# Patient Record
Sex: MALE | Race: WHITE | NOT HISPANIC OR LATINO | Employment: OTHER | URBAN - METROPOLITAN AREA
[De-identification: names, ages, dates, MRNs, and addresses within clinical notes are randomized per-mention and may not be internally consistent; named-entity substitution may affect disease eponyms.]

---

## 2018-11-13 ENCOUNTER — OFFICE VISIT (OUTPATIENT)
Dept: INTERNAL MEDICINE CLINIC | Facility: CLINIC | Age: 58
End: 2018-11-13
Payer: COMMERCIAL

## 2018-11-13 VITALS
BODY MASS INDEX: 37.51 KG/M2 | OXYGEN SATURATION: 98 % | WEIGHT: 262 LBS | TEMPERATURE: 98.4 F | HEART RATE: 88 BPM | RESPIRATION RATE: 18 BRPM | DIASTOLIC BLOOD PRESSURE: 80 MMHG | SYSTOLIC BLOOD PRESSURE: 134 MMHG | HEIGHT: 70 IN

## 2018-11-13 DIAGNOSIS — R03.0 PRE-HYPERTENSION: ICD-10-CM

## 2018-11-13 DIAGNOSIS — Z13.220 ENCOUNTER FOR LIPID SCREENING FOR CARDIOVASCULAR DISEASE: ICD-10-CM

## 2018-11-13 DIAGNOSIS — Z76.89 ENCOUNTER TO ESTABLISH CARE WITH NEW DOCTOR: Primary | ICD-10-CM

## 2018-11-13 DIAGNOSIS — Z12.11 SCREEN FOR COLON CANCER: ICD-10-CM

## 2018-11-13 DIAGNOSIS — Z13.6 ENCOUNTER FOR LIPID SCREENING FOR CARDIOVASCULAR DISEASE: ICD-10-CM

## 2018-11-13 DIAGNOSIS — N52.9 ERECTILE DYSFUNCTION, UNSPECIFIED ERECTILE DYSFUNCTION TYPE: ICD-10-CM

## 2018-11-13 PROCEDURE — 99204 OFFICE O/P NEW MOD 45 MIN: CPT | Performed by: INTERNAL MEDICINE

## 2018-11-13 PROCEDURE — 3008F BODY MASS INDEX DOCD: CPT | Performed by: INTERNAL MEDICINE

## 2018-11-13 PROCEDURE — 1036F TOBACCO NON-USER: CPT | Performed by: INTERNAL MEDICINE

## 2018-11-13 NOTE — PATIENT INSTRUCTIONS
1  Fasting blood work  2  Colonoscopy  3  See information below  4   If blood work looks fine -> recommend viagra 25mg to start

## 2018-11-13 NOTE — PROGRESS NOTES
Assessment/Plan:     Diagnoses and all orders for this visit:    Encounter to establish care with new doctor    Screen for colon cancer  Comments:  never had colonoscopy/colon cancer screening in past -> ref provided  Orders:  -     Ambulatory referral to Colorectal Surgery; Future    Erectile dysfunction, unspecified erectile dysfunction type  Comments:  BW ordered and if WNL -> t/c sildenafil rx(already d/w pt side effects of medication and handout provided on the same)  Orders:  -     Comprehensive metabolic panel; Future  -     TSH, 3rd generation with Free T4 reflex  -     CBC and differential  -     UA w Reflex to Microscopic w Reflex to Culture -Lab Collect  -     Prolactin; Future    Pre-hypertension  Comments:  discussed weight loss/lifestyle changes    Encounter for lipid screening for cardiovascular disease  -     Lipid Panel with Direct LDL reflex; Future          Subjective:      Patient ID: Adela Hale is a 62 y o  male  HPI    New to practice, here to establish care  Takes no medication on regular basis  No past med hx problems except excess weight and recent months of having Erectile dysfunction symptoms  Hasn't seen a dr in 8 or more years  declines flu vaccine  Never had colonoscopy in past, requests referral to have this completed  Has no problem with libido or ejaculation and no  rashes or lesions  Under more stress lately, his father passed away s/p strokes/dementia in 80s  Denies any other complaints      Family History   Problem Relation Age of Onset    Stroke Father 66    Diabetes Father 76    Alzheimer's disease Father     Heart attack Father         dx'd in 76s     Social History     Social History    Marital status: /Civil Union     Spouse name: N/A    Number of children: N/A    Years of education: N/A     Occupational History    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[address] [address] [address] including [address]     Social History Main Topics    Smoking status: Never Smoker    Smokeless tobacco: Never Used    Alcohol use Yes      Comment: occasional    Drug use: No    Sexual activity: Not on file     Other Topics Concern    Not on file     Social History Narrative    Drinks coffee     Past Medical History:   Diagnosis Date    Blood type A+      Vitals:    11/13/18 0823 11/13/18 0900   BP: 140/84 134/80   Pulse: 88    Resp: 18    Temp: 98 4 °F (36 9 °C)    TempSrc: Oral    SpO2: 98%    Weight: 119 kg (262 lb)    Height: 5' 10" (1 778 m)      Body mass index is 37 59 kg/m²  No current outpatient prescriptions on file  No Known Allergies  Past Surgical History:   Procedure Laterality Date    HERNIA REPAIR Left     twice repaired in past    TONSILLECTOMY           Review of Systems   Constitutional: Negative for fever  HENT: Negative for congestion  Eyes: Negative for visual disturbance  Respiratory: Negative for shortness of breath  Cardiovascular: Negative for chest pain  Gastrointestinal: Negative for abdominal pain  Genitourinary: Negative for discharge, dysuria and penile swelling  Musculoskeletal: Negative for arthralgias  Skin: Negative for rash  Neurological: Negative for headaches  Psychiatric/Behavioral: Negative for dysphoric mood  Objective:      /80   Pulse 88   Temp 98 4 °F (36 9 °C) (Oral)   Resp 18   Ht 5' 10" (1 778 m)   Wt 119 kg (262 lb)   SpO2 98%   BMI 37 59 kg/m²          Physical Exam   Constitutional: He appears well-developed and well-nourished  +obese   HENT:   Head: Normocephalic and atraumatic  Mouth/Throat: Oropharynx is clear and moist    Eyes: Pupils are equal, round, and reactive to light  Conjunctivae are normal    Cardiovascular: Normal rate, regular rhythm and normal heart sounds  No murmur heard  Pulmonary/Chest: Effort normal and breath sounds normal  He has no wheezes  He has no rales  Abdominal: Soft  Bowel sounds are normal  There is no tenderness     Musculoskeletal: He exhibits no edema  Neurological: He is alert  Psychiatric: He has a normal mood and affect  His behavior is normal    Vitals reviewed

## 2018-11-20 LAB
ALBUMIN SERPL-MCNC: 4.2 G/DL (ref 3.6–5.1)
ALBUMIN/GLOB SERPL: 1.4 (CALC) (ref 1–2.5)
ALP SERPL-CCNC: 32 U/L (ref 40–115)
ALT SERPL-CCNC: 23 U/L (ref 9–46)
APPEARANCE UR: CLEAR
AST SERPL-CCNC: 16 U/L (ref 10–35)
BACTERIA UR CULT: NORMAL
BACTERIA UR QL AUTO: NORMAL /HPF
BASOPHILS # BLD AUTO: 71 CELLS/UL (ref 0–200)
BASOPHILS NFR BLD AUTO: 0.8 %
BILIRUB SERPL-MCNC: 0.6 MG/DL (ref 0.2–1.2)
BILIRUB UR QL STRIP: NEGATIVE
BUN SERPL-MCNC: 13 MG/DL (ref 7–25)
BUN/CREAT SERPL: ABNORMAL (CALC) (ref 6–22)
CALCIUM SERPL-MCNC: 9.3 MG/DL (ref 8.6–10.3)
CHLORIDE SERPL-SCNC: 100 MMOL/L (ref 98–110)
CHOLEST SERPL-MCNC: 172 MG/DL
CHOLEST/HDLC SERPL: 3.7 (CALC)
CO2 SERPL-SCNC: 30 MMOL/L (ref 20–32)
COLOR UR: YELLOW
CREAT SERPL-MCNC: 1.04 MG/DL (ref 0.7–1.33)
EOSINOPHIL # BLD AUTO: 151 CELLS/UL (ref 15–500)
EOSINOPHIL NFR BLD AUTO: 1.7 %
ERYTHROCYTE [DISTWIDTH] IN BLOOD BY AUTOMATED COUNT: 12.7 % (ref 11–15)
GLOBULIN SER CALC-MCNC: 3.1 G/DL (CALC) (ref 1.9–3.7)
GLUCOSE SERPL-MCNC: 98 MG/DL (ref 65–99)
GLUCOSE UR QL STRIP: NEGATIVE
HCT VFR BLD AUTO: 44.7 % (ref 38.5–50)
HDLC SERPL-MCNC: 46 MG/DL
HGB BLD-MCNC: 14.9 G/DL (ref 13.2–17.1)
HGB UR QL STRIP: NEGATIVE
HYALINE CASTS #/AREA URNS LPF: NORMAL /LPF
KETONES UR QL STRIP: NEGATIVE
LDLC SERPL CALC-MCNC: 107 MG/DL (CALC)
LEUKOCYTE ESTERASE UR QL STRIP: NEGATIVE
LYMPHOCYTES # BLD AUTO: 2412 CELLS/UL (ref 850–3900)
LYMPHOCYTES NFR BLD AUTO: 27.1 %
MCH RBC QN AUTO: 30 PG (ref 27–33)
MCHC RBC AUTO-ENTMCNC: 33.3 G/DL (ref 32–36)
MCV RBC AUTO: 90.1 FL (ref 80–100)
MONOCYTES # BLD AUTO: 908 CELLS/UL (ref 200–950)
MONOCYTES NFR BLD AUTO: 10.2 %
NEUTROPHILS # BLD AUTO: 5358 CELLS/UL (ref 1500–7800)
NEUTROPHILS NFR BLD AUTO: 60.2 %
NITRITE UR QL STRIP: NEGATIVE
NONHDLC SERPL-MCNC: 126 MG/DL (CALC)
PH UR STRIP: 6 [PH] (ref 5–8)
PLATELET # BLD AUTO: 272 THOUSAND/UL (ref 140–400)
PMV BLD REES-ECKER: 10.1 FL (ref 7.5–12.5)
POTASSIUM SERPL-SCNC: 4.5 MMOL/L (ref 3.5–5.3)
PROLACTIN SERPL-MCNC: 8.3 NG/ML (ref 2–18)
PROT SERPL-MCNC: 7.3 G/DL (ref 6.1–8.1)
PROT UR QL STRIP: NEGATIVE
RBC # BLD AUTO: 4.96 MILLION/UL (ref 4.2–5.8)
RBC #/AREA URNS HPF: NORMAL /HPF
SL AMB EGFR AFRICAN AMERICAN: 91 ML/MIN/1.73M2
SL AMB EGFR NON AFRICAN AMERICAN: 79 ML/MIN/1.73M2
SODIUM SERPL-SCNC: 137 MMOL/L (ref 135–146)
SP GR UR STRIP: 1.02 (ref 1–1.03)
SQUAMOUS #/AREA URNS HPF: NORMAL /HPF
TRIGL SERPL-MCNC: 94 MG/DL
TSH SERPL-ACNC: 1.88 MIU/L (ref 0.4–4.5)
WBC # BLD AUTO: 8.9 THOUSAND/UL (ref 3.8–10.8)
WBC #/AREA URNS HPF: NORMAL /HPF

## 2018-11-23 ENCOUNTER — TELEPHONE (OUTPATIENT)
Dept: INTERNAL MEDICINE CLINIC | Facility: CLINIC | Age: 58
End: 2018-11-23

## 2018-11-23 DIAGNOSIS — N52.8 OTHER MALE ERECTILE DYSFUNCTION: Primary | ICD-10-CM

## 2018-11-23 NOTE — TELEPHONE ENCOUNTER
----- Message from Nancy Lozada, DO sent at 11/21/2018  4:05 PM EST -----  BW is back and looks fine including cholesterol which is 172  Does Abdirahmanney Guzmanniall want to try sildenafil(viagra) medication?

## 2018-11-27 PROBLEM — N52.8 OTHER MALE ERECTILE DYSFUNCTION: Status: ACTIVE | Noted: 2018-11-27

## 2018-11-27 RX ORDER — SILDENAFIL 25 MG/1
25 TABLET, FILM COATED ORAL DAILY PRN
Qty: 10 TABLET | Refills: 1 | Status: SHIPPED | OUTPATIENT
Start: 2018-11-27 | End: 2019-02-14 | Stop reason: SDUPTHER

## 2018-11-27 NOTE — TELEPHONE ENCOUNTER
Patient called and was notified and yes he does want the Sildenafil ( Viagra) sent to the Pharmacy on file please

## 2019-02-14 ENCOUNTER — OFFICE VISIT (OUTPATIENT)
Dept: INTERNAL MEDICINE CLINIC | Facility: CLINIC | Age: 59
End: 2019-02-14
Payer: COMMERCIAL

## 2019-02-14 VITALS
HEIGHT: 70 IN | SYSTOLIC BLOOD PRESSURE: 130 MMHG | HEART RATE: 82 BPM | RESPIRATION RATE: 18 BRPM | OXYGEN SATURATION: 99 % | WEIGHT: 262.6 LBS | BODY MASS INDEX: 37.59 KG/M2 | DIASTOLIC BLOOD PRESSURE: 80 MMHG | TEMPERATURE: 97.1 F

## 2019-02-14 DIAGNOSIS — E66.9 CLASS 2 OBESITY WITHOUT SERIOUS COMORBIDITY WITH BODY MASS INDEX (BMI) OF 37.0 TO 37.9 IN ADULT, UNSPECIFIED OBESITY TYPE: ICD-10-CM

## 2019-02-14 DIAGNOSIS — N52.8 OTHER MALE ERECTILE DYSFUNCTION: Primary | ICD-10-CM

## 2019-02-14 DIAGNOSIS — Z11.59 NEED FOR HEPATITIS C SCREENING TEST: ICD-10-CM

## 2019-02-14 PROBLEM — E66.812 CLASS 2 OBESITY WITHOUT SERIOUS COMORBIDITY WITH BODY MASS INDEX (BMI) OF 37.0 TO 37.9 IN ADULT: Status: ACTIVE | Noted: 2019-02-14

## 2019-02-14 PROCEDURE — 1036F TOBACCO NON-USER: CPT | Performed by: INTERNAL MEDICINE

## 2019-02-14 PROCEDURE — 99213 OFFICE O/P EST LOW 20 MIN: CPT | Performed by: INTERNAL MEDICINE

## 2019-02-14 PROCEDURE — 3008F BODY MASS INDEX DOCD: CPT | Performed by: INTERNAL MEDICINE

## 2019-02-14 RX ORDER — SILDENAFIL 50 MG/1
50 TABLET, FILM COATED ORAL DAILY PRN
Qty: 10 TABLET | Refills: 1 | Status: SHIPPED | OUTPATIENT
Start: 2019-02-14 | End: 2021-10-05 | Stop reason: SDUPTHER

## 2019-02-14 RX ORDER — SILDENAFIL 25 MG/1
25 TABLET, FILM COATED ORAL DAILY PRN
Qty: 10 TABLET | Refills: 1 | Status: SHIPPED | OUTPATIENT
Start: 2019-02-14 | End: 2019-02-14

## 2019-02-14 NOTE — PROGRESS NOTES
Assessment/Plan:     Diagnoses and all orders for this visit:    Other male erectile dysfunction  Comments:  c/w viagra, dose increase today as trial for better efficacy  Orders:  -     Discontinue: sildenafil (VIAGRA) 25 MG tablet; Take 1 tablet (25 mg total) by mouth daily as needed for erectile dysfunction  -     sildenafil (VIAGRA) 50 MG tablet; Take 1 tablet (50 mg total) by mouth daily as needed for erectile dysfunction    Need for hepatitis C screening test  -     Hepatitis C antibody; Future    Class 2 obesity without serious comorbidity with body mass index (BMI) of 37 0 to 37 9 in adult, unspecified obesity type  Comments:  weight loss counseling provided          Subjective:      Patient ID: Valentino Alice is a 62 y o  male  HPI    Here for follow up, taking viagra prn with some improvement in ED and no side effects  Denies CP/SOB/BALTAZAR at rest or with exertion  Last summer he was walking 35-40+ miles/week and wants to get back on an exercise routine to lose weight  Does not eat snacks or sugary drinks  Does have more carbs in diet and looking to cut back  Never had Hep C screening in past   Reports UTD on tetanus vaccine in last 3-4 years(works in [employer])  No other complaints  Past Medical History:   Diagnosis Date    Blood type A+     Other male erectile dysfunction 11/27/2018     Vitals:    02/14/19 0756   BP: 130/80   Pulse: 82   Resp: 18   Temp: (!) 97 1 °F (36 2 °C)   SpO2: 99%   Weight: 119 kg (262 lb 9 6 oz)   Height: 5' 10" (1 778 m)     Body mass index is 37 68 kg/m²  Current Outpatient Medications:     sildenafil (VIAGRA) 50 MG tablet, Take 1 tablet (50 mg total) by mouth daily as needed for erectile dysfunction, Disp: 10 tablet, Rfl: 1  No Known Allergies      Review of Systems   Constitutional: Negative for fever  HENT: Negative for congestion  Eyes: Negative for visual disturbance  Respiratory: Negative for shortness of breath      Cardiovascular: Negative for chest pain  Gastrointestinal: Negative for abdominal pain  Genitourinary: Negative for dysuria  Musculoskeletal: Negative for arthralgias  Skin: Negative for rash  Neurological: Negative for headaches  Psychiatric/Behavioral: Negative for dysphoric mood  Objective:      /80   Pulse 82   Temp (!) 97 1 °F (36 2 °C)   Resp 18   Ht 5' 10" (1 778 m)   Wt 119 kg (262 lb 9 6 oz)   SpO2 99%   BMI 37 68 kg/m²          Physical Exam   Constitutional: He appears well-developed and well-nourished  HENT:   Head: Normocephalic and atraumatic  Cardiovascular: Normal rate, regular rhythm and normal heart sounds  No murmur heard  Pulmonary/Chest: Effort normal and breath sounds normal  He has no wheezes  He has no rales  Abdominal: Soft  Bowel sounds are normal  There is no tenderness  Musculoskeletal: He exhibits no edema  Neurological: He is alert  Psychiatric: He has a normal mood and affect  His behavior is normal    Vitals reviewed

## 2019-02-14 NOTE — PATIENT INSTRUCTIONS
1  Return in 6 months    Weight Management   AMBULATORY CARE:   Why it is important to manage your weight:  Being overweight increases your risk of health conditions such as heart disease, high blood pressure, type 2 diabetes, and certain types of cancer  It can also increase your risk for osteoarthritis, sleep apnea, and other respiratory problems  Aim for a slow, steady weight loss  Even a small amount of weight loss can lower your risk of health problems  How to lose weight safely:  A safe and healthy way to lose weight is to eat fewer calories and get regular exercise  You can lose up about 1 pound a week by decreasing the number of calories you eat by 500 calories each day  You can decrease calories by eating smaller portion sizes or by cutting out high-calorie foods  Read labels to find out how many calories are in the foods you eat  You can also burn calories with exercise such as walking, swimming, or biking  You will be more likely to keep weight off if you make these changes part of your lifestyle  Healthy meal plan for weight management:  A healthy meal plan includes a variety of foods, contains fewer calories, and helps you stay healthy  A healthy meal plan includes the following:  · Eat whole-grain foods more often  A healthy meal plan should contain fiber  Fiber is the part of grains, fruits, and vegetables that is not broken down by your body  Whole-grain foods are healthy and provide extra fiber in your diet  Some examples of whole-grain foods are whole-wheat breads and pastas, oatmeal, brown rice, and bulgur  · Eat a variety of vegetables every day  Include dark, leafy greens such as spinach, kale, rachele greens, and mustard greens  Eat yellow and orange vegetables such as carrots, sweet potatoes, and winter squash  · Eat a variety of fruits every day  Choose fresh or canned fruit (canned in its own juice or light syrup) instead of juice   Fruit juice has very little or no fiber     · Eat low-fat dairy foods  Drink fat-free (skim) milk or 1% milk  Eat fat-free yogurt and low-fat cottage cheese  Try low-fat cheeses such as mozzarella and other reduced-fat cheeses  · Choose meat and other protein foods that are low in fat  Choose beans or other legumes such as split peas or lentils  Choose fish, skinless poultry (chicken or turkey), or lean cuts of red meat (beef or pork)  Before you cook meat or poultry, cut off any visible fat  · Use less fat and oil  Try baking foods instead of frying them  Add less fat, such as margarine, sour cream, regular salad dressing and mayonnaise to foods  Eat fewer high-fat foods  Some examples of high-fat foods include french fries, doughnuts, ice cream, and cakes  · Eat fewer sweets  Limit foods and drinks that are high in sugar  This includes candy, cookies, regular soda, and sweetened drinks  Ways to decrease calories:   · Eat smaller portions  ¨ Use a small plate with smaller servings  ¨ Do not eat second helpings  ¨ When you eat at a restaurant, ask for a box and place half of your meal in the box before you eat  ¨ Share an entrée with someone else  · Replace high-calorie snacks with healthy, low-calorie snacks  ¨ Choose fresh fruit, vegetables, fat-free rice cakes, or air-popped popcorn instead of potato chips, nuts, or chocolate  ¨ Choose water or calorie-free drinks instead of soda or sweetened drinks  · Eat regular meals  Skipping meals can lead to overeating later in the day  Eat a healthy snack in place of a meal if you do not have time to eat a regular meal      · Do not shop for groceries when you are hungry  You may be more likely to make unhealthy food choices  Take a grocery list of healthy foods and shop after you have eaten  Exercise:  Exercise at least 30 minutes per day on most days of the week  Some examples of exercise include walking, biking, dancing, and swimming   You can also fit in more physical activity by taking the stairs instead of the elevator or parking farther away from stores  Ask your healthcare provider about the best exercise plan for you  Other things to consider as you try to lose weight:   · Be aware of situations that may give you the urge to overeat, such as eating while watching television  Find ways to avoid these situations  For example, read a book, go for a walk, or do crafts  · Meet with a weight loss support group or friends who are also trying to lose weight  This may help you stay motivated to continue working on your weight loss goals  © 2017 [address] [employer]  Information is for End User's use only and may not be sold, redistributed or otherwise used for commercial purposes  All illustrations and images included in CareNotes® are the copyrighted property of [employer]  or [employer]  The above information is an  only  It is not intended as medical advice for individual conditions or treatments  Talk to your doctor, nurse or pharmacist before following any medical regimen to see if it is safe and effective for you

## 2019-03-26 ENCOUNTER — TELEPHONE (OUTPATIENT)
Dept: INTERNAL MEDICINE CLINIC | Facility: CLINIC | Age: 59
End: 2019-03-26

## 2019-03-26 NOTE — TELEPHONE ENCOUNTER
Left message for patient to call back regarding CRC due  Colonoscopy Due:  Patient is due for colonoscopy  Has patient had this done with in the past, if yes what year and where- which dr? If no it is recommended to have this done, can mail referral for patient to have this done  If patient refuses colonoscopy- Patient also has the option to have a Fit Kit done which would be a stool sample- this would need to be done yearly  If this would be positive a colonoscopy would need to be completed

## 2019-10-15 ENCOUNTER — OFFICE VISIT (OUTPATIENT)
Dept: INTERNAL MEDICINE CLINIC | Facility: CLINIC | Age: 59
End: 2019-10-15
Payer: COMMERCIAL

## 2019-10-15 VITALS
HEIGHT: 70 IN | HEART RATE: 81 BPM | TEMPERATURE: 97.3 F | DIASTOLIC BLOOD PRESSURE: 74 MMHG | BODY MASS INDEX: 30.35 KG/M2 | OXYGEN SATURATION: 97 % | RESPIRATION RATE: 18 BRPM | WEIGHT: 212 LBS | SYSTOLIC BLOOD PRESSURE: 132 MMHG

## 2019-10-15 DIAGNOSIS — Z12.5 SCREENING FOR PROSTATE CANCER: ICD-10-CM

## 2019-10-15 DIAGNOSIS — E66.09 CLASS 1 OBESITY DUE TO EXCESS CALORIES WITHOUT SERIOUS COMORBIDITY WITH BODY MASS INDEX (BMI) OF 30.0 TO 30.9 IN ADULT: Primary | ICD-10-CM

## 2019-10-15 DIAGNOSIS — D22.9 CHANGE IN SKIN MOLE: ICD-10-CM

## 2019-10-15 DIAGNOSIS — L72.11 PILAR CYST: ICD-10-CM

## 2019-10-15 PROBLEM — E66.811 CLASS 1 OBESITY DUE TO EXCESS CALORIES WITH BODY MASS INDEX (BMI) OF 30.0 TO 30.9 IN ADULT: Status: ACTIVE | Noted: 2019-02-14

## 2019-10-15 PROCEDURE — 3008F BODY MASS INDEX DOCD: CPT | Performed by: NURSE PRACTITIONER

## 2019-10-15 PROCEDURE — 99213 OFFICE O/P EST LOW 20 MIN: CPT | Performed by: NURSE PRACTITIONER

## 2019-10-15 NOTE — PROGRESS NOTES
Assessment/Plan:    Class 1 obesity due to excess calories with body mass index (BMI) of 30 0 to 30 9 in adult  BMI Counseling: Body mass index is 30 42 kg/m²  The BMI is above normal  Nutrition recommendations include reducing portion sizes, decreasing overall calorie intake, 3-5 servings of fruits/vegetables daily, reducing fast food intake, consuming healthier snacks, decreasing soda and/or juice intake, moderation in carbohydrate intake and increasing intake of lean protein  Exercise recommendations include exercising 3-5 times per week  continue current exercise and eating plan! Lost 56 lbs over the last 8 months! Diagnoses and all orders for this visit:    Class 1 obesity due to excess calories without serious comorbidity with body mass index (BMI) of 30 0 to 30 9 in adult  -     Comprehensive metabolic panel; Future  -     Lipid Panel with Direct LDL reflex; Future    Change in skin mole  -     Ambulatory referral to Dermatology; Future    Pilar cyst  -     Ambulatory referral to Dermatology; Future    Screening for prostate cancer  -     PSA Total, Diagnostic; Future          Subjective:      Patient ID: Paulette Solorio is a 61 y o  male      Here today for follow up  Zana Cayr reports doing great  Lost 56 lbs in the last 8 months   Walks 70 miles a week, does 80 sit ups daily    He has been eating healthy as well   His ED has improved since losing weight, he has not needed viagra     He is concerned about a mole on his left leg  Its been there forever but recently noticed it changing color and getting bitter     Also he had a cyst removed from his scalp years ago and its coming back  He's requesting to have it removed again   It does not bother him             The following portions of the patient's history were reviewed and updated as appropriate: allergies, current medications, past family history, past medical history, past social history, past surgical history and problem list     Review of Systems Constitutional: Negative for activity change, appetite change, fatigue and unexpected weight change  Eyes: Negative for visual disturbance  Respiratory: Negative for cough, chest tightness, shortness of breath and wheezing  Cardiovascular: Negative for chest pain, palpitations and leg swelling  Gastrointestinal: Negative for abdominal pain, blood in stool, constipation and diarrhea  Genitourinary: Negative for difficulty urinating  Musculoskeletal: Negative for arthralgias  Skin: Negative for color change and rash  Changing mole    Neurological: Negative for dizziness, weakness, light-headedness and headaches  Psychiatric/Behavioral: Negative for sleep disturbance  Objective:      /74   Pulse 81   Temp (!) 97 3 °F (36 3 °C) (Oral)   Resp 18   Ht 5' 10" (1 778 m)   Wt 96 2 kg (212 lb)   SpO2 97%   BMI 30 42 kg/m²          Physical Exam   Constitutional: He is oriented to person, place, and time  He appears well-developed and well-nourished  HENT:   Head: Normocephalic and atraumatic  Mouth/Throat: Oropharynx is clear and moist    Eyes: Pupils are equal, round, and reactive to light  Conjunctivae are normal    Neck: No thyromegaly present  Cardiovascular: Normal rate, regular rhythm, normal heart sounds and intact distal pulses  Pulmonary/Chest: Effort normal and breath sounds normal    Abdominal: Soft  Bowel sounds are normal    Musculoskeletal: Normal range of motion  He exhibits no edema  Lymphadenopathy:     He has no cervical adenopathy  Neurological: He is alert and oriented to person, place, and time  Skin: Skin is warm and dry  Brown nevi noted on left lateral calf , some inconsistent coloring noted in the center, uneven borders    Firm non tender lump noted on top of scalp    Psychiatric: He has a normal mood and affect  His behavior is normal    Vitals reviewed

## 2019-10-15 NOTE — ASSESSMENT & PLAN NOTE
BMI Counseling: Body mass index is 30 42 kg/m²  The BMI is above normal  Nutrition recommendations include reducing portion sizes, decreasing overall calorie intake, 3-5 servings of fruits/vegetables daily, reducing fast food intake, consuming healthier snacks, decreasing soda and/or juice intake, moderation in carbohydrate intake and increasing intake of lean protein  Exercise recommendations include exercising 3-5 times per week  continue current exercise and eating plan! Lost 56 lbs over the last 8 months!

## 2020-08-11 ENCOUNTER — OFFICE VISIT (OUTPATIENT)
Dept: DERMATOLOGY | Facility: CLINIC | Age: 60
End: 2020-08-11
Payer: COMMERCIAL

## 2020-08-11 VITALS — BODY MASS INDEX: 31.68 KG/M2 | TEMPERATURE: 98.6 F | WEIGHT: 221.3 LBS | HEIGHT: 70 IN

## 2020-08-11 DIAGNOSIS — L82.1 SEBORRHEIC KERATOSIS: Primary | ICD-10-CM

## 2020-08-11 DIAGNOSIS — L72.11 PILAR CYST: ICD-10-CM

## 2020-08-11 PROCEDURE — 1036F TOBACCO NON-USER: CPT | Performed by: STUDENT IN AN ORGANIZED HEALTH CARE EDUCATION/TRAINING PROGRAM

## 2020-08-11 PROCEDURE — 99203 OFFICE O/P NEW LOW 30 MIN: CPT | Performed by: STUDENT IN AN ORGANIZED HEALTH CARE EDUCATION/TRAINING PROGRAM

## 2020-08-11 NOTE — PROGRESS NOTES
[facility] Note     Patient Name: Betty Duty  Encounter Date: 8/11/2020     Have you been cared for by a [facility] Dermatologist in the last 3 years and, if so, which one? No    · Have you traveled outside of the [address] [place] [address] in the past 3 months or outside of the [facility] area in the last 2 weeks? No     May we call your Preferred Phone number to discuss your specific medical information? Yes     May we leave a detailed message that includes your specific medical information? Yes      Today's Chief Concerns:   Concern #1:  Cyst in scalp   Concern #2:  Lesion on leg     Past Medical History:  Have you personally ever had or currently have any of the following? · Skin cancer (such as Melanoma, Basal Cell Carcinoma, Squamous Cell Carcinoma? (If Yes, please provide more detail)- No  · Eczema: No  · Psoriasis: No  · HIV/AIDS: No  · Hepatitis B or C: No  · Tuberculosis: No  · Systemic Immunosuppression such as Diabetes, Biologic or Immunotherapy, Chemotherapy, Organ Transplantation, Bone Marrow Transplantation (If YES, please provide more detail): No  · Radiation Treatment (If YES, please provide more detail): No  · Any other major medical conditions/concerns? 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 custom firearm art     Family History:  Have any of your "first degree relatives" (parent, brother, sister, or child) had any of the following       · Skin cancer such as Melanoma or Merkel Cell Carcinoma or Pancreatic Cancer? No  · Eczema, Asthma, Hay Fever or Seasonal Allergies: No  · Psoriasis or Psoriatic Arthritis: No  · Do any other medical conditions seem to run in your family? If Yes, what condition and which relatives?   YES, father had diabetes and had a stoke    Current Medications:   (please update all dermatological medications before printing patient's AVS!)      Current Outpatient Medications:     sildenafil (VIAGRA) 50 MG tablet, Take 1 tablet (50 mg total) by mouth daily as needed for erectile dysfunction (Patient not taking: Reported on 10/15/2019), Disp: 10 tablet, Rfl: 1      Review of Systems:  Have you recently had or currently have any of the following? If YES, what are you doing for the problem? · Fever, chills or unintended weight loss: No  · Sudden loss or change in your vision: No  · Nausea, vomiting or blood in your stool: No  · Painful or swollen joints: No  · Wheezing or cough: No  · Changing mole or non-healing wound: No  · Nosebleeds: No  · Excessive sweating: No  · Easy or prolonged bleeding? No  · Over the last 2 weeks, how often have you been bothered by the following problems? · Taking little interest or pleasure in doing things: 1 - Not at All  · Feeling down, depressed, or hopeless: 1 - Not at All  · Rapid heartbeat with epinephrine:  No    · FEMALES ONLY:    · Are you pregnant or planning to become pregnant? No  · Are you currently or planning to be nursing or breast feeding? No    · Any known allergies? · No Known Allergies      Physical Exam:     Was a chaperone (Derm Clinical Assistant) present throughout the entire Physical Exam? Yes     Did the Dermatology Team specifically  the patient on the importance of a Full Skin Exam to be sure that nothing is missed clinically?  Yes}  o Did the patient ultimately request or accept a Full Skin Exam?  NO  o Did the patient specifically refuse to have the areas "under-the-bra" examined by the Dermatologist? No  o Did the patient specifically refuse to have the areas "under-the-underwear" examined by the Dermatologist? No    CONSTITUTIONAL:   Vitals:    08/11/20 0833   Temp: 98 6 °F (37 °C)   TempSrc: Temporal   Weight: 100 kg (221 lb 4 8 oz)   Height: 5' 10" (1 778 m)       PSYCH: Normal mood and affect  EYES: Normal conjunctiva  ENT: Normal lips and oral mucosa  CARDIOVASCULAR: No edema  RESPIRATORY: Normal respirations  HEME/LYMPH/IMMUNO:  No regional lymphadenopathy except as noted below in "ASSESSMENT AND PLAN BY DIAGNOSIS"    SKIN:  FULL ORGAN SYSTEM EXAM  Hair, Scalp, Ears, Face Normal except as noted below in Assessment   Neck Normal except as noted below in Assessment   Right Arm/Hand Normal except as noted below in Assessment   Left Arm/Hand Normal except as noted below in Assessment                   Right Leg, Foot Normal except as noted below in Assessment   Left Leg, Foot Normal except as noted below in Assessment        Assessment and Plan by Diagnosis:    History of Present Condition:     Duration:  How long has this been an issue for you?    o  about one year   Location Affected:  Where on the body is this affecting you?    o  left lower leg   Quality:  Is there any bleeding, pain, itch, burning/irritation, or redness associated with the skin lesion? o  change in shape and size of mole, sometimes tender and red    Severity:  Describe any bleeding, pain, itch, burning/irritation, or redness on a scale of 1 to 10 (with 10 being the worst)  o  3-4   Timing:  Does this condition seem to be there pretty constantly or do you notice it more at specific times throughout the day? o  when rubbed against for prolonged periods of time    Context:  Have you ever noticed that this condition seems to be associated with specific activities you do?    o  denies   Modifying Factors:    o Anything that seems to make the condition worse?    -  denies  o What have you tried to do to make the condition better?    -  denies   Associated Signs and Symptoms:  Does this skin lesion seem to be associated with any of the following:  o  SL AMB DERM SIGNS AND SYMPTOMS: Redness     1   SEBORRHEIC KERATOSIS; NON-INFLAMED    Physical Exam:   Anatomic Location Affected:  Left leg   Morphological Description:  1 cm x 0 5 cm tan macule with some mild surface change  Pertinent Positives:   Pertinent Negatives: Additional History of Present Condition:  Present for about one year  Located on the lower left leg  Patient reports change in shape and size of mole, sometimes red and tender of 3-4 out of 10  No attempted treatment  Assessment and Plan:  Based on a thorough discussion of this condition and the management approach to it (including a comprehensive discussion of the known risks, side effects and potential benefits of treatment), the patient (family) agrees to implement the following specific plan:   When outside we recommend using a wide brim hat, sunglasses, long sleeve and pants, sunscreen with SPF 39+ with reapplication every 2 hours, or SPF specific clothing   [employer]  com website with sun protective clothing     Seborrheic Keratosis  A seborrheic keratosis is a harmless warty spot that appears during adult life as a common sign of skin aging  Seborrheic keratoses can arise on any area of skin, covered or uncovered, with the usual exception of the palms and soles  They do not arise from mucous membranes  Seborrheic keratoses can have highly variable appearance  Seborrheic keratoses are extremely common  It has been estimated that over 90% of adults over the age of 61 years have one or more of them  They occur in males and females of all races, typically beginning to erupt in the 35s or 45s  They are uncommon under the age of 21 years  The precise cause of seborrhoeic keratoses is not known  Seborrhoeic keratoses are considered degenerative in nature  As time goes by, seborrheic keratoses tend to become more numerous  Some people inherit a tendency to develop a very large number of them; some people may have hundreds of them      The name "seborrheic keratosis" is misleading, because these lesions are not limited to a seborrhoeic distribution (scalp, mid-face, chest, upper back), nor are they formed from sebaceous glands, nor are they associated with sebum -- which is greasy  Seborrheic keratosis may also be called "SK," "Seb K," "basal cell papilloma," "senile wart," or "barnacle "      Researchers have noted:   Eruptive seborrhoeic keratoses can follow sunburn or dermatitis   Skin friction may be the reason they appear in body folds   Viral cause (e g , human papillomavirus) seems unlikely   Stable and clonal mutations or activation of FRFR3, PIK3CA, MANUEL, AKT1 and EGFR genes are found in seborrhoeic keratoses   Seborrhoeic keratosis can arise from solar lentigo   FRFR3 mutations also arise in solar lentigines  These mutations are associated with increased age and location on the head and neck, suggesting a role of ultraviolet radiation in these lesions   Seborrheic keratoses do not harbour tumour suppressor gene mutations   Epidermal growth factor receptor inhibitors, which are used to treat some cancers, often result in an increase in verrucal (warty) keratoses  There is no easy way to remove multiple lesions on a single occasion  Unless a specific lesion is "inflamed" and is causing pain or stinging/burning or is bleeding, most insurance companies do not authorize treatment  2  PILAR CYST, RECURRENT    Physical Exam:   Anatomic Location Affected:  Left parietal scalp    Morphological Description:  1 cm x 1 cm dermal nodule    Pertinent Positives:   Pertinent Negatives: Additional History of Present Condition:  Patient reports removal in 1992  Assessment and Plan:  Based on a thorough discussion of this condition and the management approach to it (including a comprehensive discussion of the known risks, side effects and potential benefits of treatment), the patient (family) agrees to implement the following specific plan:   schedule for excision with Dr Issa Hurt    What are epidermal inclusion cysts? Epidermal inclusion cysts are the most common, benign cutaneous cysts   There are many different names for epidermal inclusion cysts, including epidermoid cyst, epidermal cyst, infundibular cyst, inclusion cyst, and keratin cyst  These cysts can occur anywhere on the body and typically present as nodules directly underneath the skin  There is often a visible pore or opening in the center  The cysts are freely moveable and can range from a few millimeters to several centimeters in diameter  The center of epidermoid cysts almost always contains keratin, which has a cheesy appearance, and not sebum  They also do not originate from sebaceous glands  Therefore, epidermal inclusion cysts are not the same as sebaceous cysts  Cysts may remain stable or progressively enlarge over time  There are no reliable predictive factors to tell if an epidermal inclusion cyst will enlarge, become inflamed, or remain quiescent  Infected cysts tend to become larger, turn red, and are more noticeable to the patient  There may be accompanying pain and discomfort  What causes epidermal inclusion cysts? Epidermal inclusion cysts often appear out of the blue and are not contagious  They are due to a proliferation of epidermal cells within the dermis and are more common in men than women  They occur more frequently in patients in their 20s to 45s  Epidermal inclusion cysts by themselves are usually not inherited, but they can be hereditary in rare syndromes such as Lazo syndrome, nodular elastosis with cysts and comedones (Favre-Racouchot syndrome), and basal cell nevus syndrome (Gorlin syndrome)  Elderly patients with chronic sun-damaged skin areas have a higher likelihood of developing epidermoid cysts  They often occur in areas where hair follicles have been inflamed or repeatedly irritated are more frequent in patients with acne vulgaris  In the  period, they are called milia  Patients on BRAF inhibitors such as imiquimod and cyclosporine have a higher incidence of epidermoid cysts of the face      How do we diagnose an epidermal inclusion cyst?  Epidermoid inclusion cysts are often diagnosed by history and physical exam  There is usually no need for biopsy prior to removal   Radiographic and laboratory exams, such as ultrasound studies, are unnecessary and not typically ordered unless the practitioner suspects a genetic condition  What is the treatment for an epidermal inclusion cyst?  Inflamed, uninfected epidermal inclusion cysts rarely resolve spontaneously without therapy or surgical intervention  Treatment is not emergent unless desired by the patient  Definitive treatment is via surgical excision with walls intact  This method will prevent recurrence  This is best done when the cyst is not inflamed, to decrease the probability of rupture during surgery  - A local anesthetic will be injected around the cyst  - A small incision is made in the skin overlying the cyst, and contents are expressed  - The incision is repaired with sutures    Another option is to use a 4mm punch biopsy with cyst extraction through the defect  Incision and drainage is often needed if the cyst is infected or inflamed  If there is surrounding cellulitis, oral antibiotic therapy may be necessary  The common agents used target methicillin sensitive Staphylococcal aureus and methicillin resistant S aureus in areas of high prevalence         Scribe Attestation    I,:   Yara Fuller MA am acting as a scribe while in the presence of the attending physician :        I,:   Christiano Posey MD personally performed the services described in this documentation    as scribed in my presence :

## 2020-08-11 NOTE — PATIENT INSTRUCTIONS
1  SEBORRHEIC KERATOSIS; NON-INFLAMED    Assessment and Plan:  Based on a thorough discussion of this condition and the management approach to it (including a comprehensive discussion of the known risks, side effects and potential benefits of treatment), the patient (family) agrees to implement the following specific plan:   When outside we recommend using a wide brim hat, sunglasses, long sleeve and pants, sunscreen with SPF 77+ with reapplication every 2 hours, or SPF specific clothing   [employer]  com website with sun protective clothing     Seborrheic Keratosis  A seborrheic keratosis is a harmless warty spot that appears during adult life as a common sign of skin aging  Seborrheic keratoses can arise on any area of skin, covered or uncovered, with the usual exception of the palms and soles  They do not arise from mucous membranes  Seborrheic keratoses can have highly variable appearance  Seborrheic keratoses are extremely common  It has been estimated that over 90% of adults over the age of 61 years have one or more of them  They occur in males and females of all races, typically beginning to erupt in the 35s or 45s  They are uncommon under the age of 21 years  The precise cause of seborrhoeic keratoses is not known  Seborrhoeic keratoses are considered degenerative in nature  As time goes by, seborrheic keratoses tend to become more numerous  Some people inherit a tendency to develop a very large number of them; some people may have hundreds of them  The name "seborrheic keratosis" is misleading, because these lesions are not limited to a seborrhoeic distribution (scalp, mid-face, chest, upper back), nor are they formed from sebaceous glands, nor are they associated with sebum -- which is greasy    Seborrheic keratosis may also be called "SK," "Seb K," "basal cell papilloma," "senile wart," or "barnacle "      Researchers have noted:   Eruptive seborrhoeic keratoses can follow sunburn or dermatitis   Skin friction may be the reason they appear in body folds   Viral cause (e g , human papillomavirus) seems unlikely   Stable and clonal mutations or activation of FRFR3, PIK3CA, MANUEL, AKT1 and EGFR genes are found in seborrhoeic keratoses   Seborrhoeic keratosis can arise from solar lentigo   FRFR3 mutations also arise in solar lentigines  These mutations are associated with increased age and location on the head and neck, suggesting a role of ultraviolet radiation in these lesions   Seborrheic keratoses do not harbour tumour suppressor gene mutations   Epidermal growth factor receptor inhibitors, which are used to treat some cancers, often result in an increase in verrucal (warty) keratoses  There is no easy way to remove multiple lesions on a single occasion  Unless a specific lesion is "inflamed" and is causing pain or stinging/burning or is bleeding, most insurance companies do not authorize treatment  2  EPIDERMAL INCLUSION CYST    Assessment and Plan:  Based on a thorough discussion of this condition and the management approach to it (including a comprehensive discussion of the known risks, side effects and potential benefits of treatment), the patient (family) agrees to implement the following specific plan:   schedule for excision     What are epidermal inclusion cysts? Epidermal inclusion cysts are the most common, benign cutaneous cysts  There are many different names for epidermal inclusion cysts, including epidermoid cyst, epidermal cyst, infundibular cyst, inclusion cyst, and keratin cyst  These cysts can occur anywhere on the body and typically present as nodules directly underneath the skin  There is often a visible pore or opening in the center  The cysts are freely moveable and can range from a few millimeters to several centimeters in diameter  The center of epidermoid cysts almost always contains keratin, which has a cheesy appearance, and not sebum   They also do not originate from sebaceous glands  Therefore, epidermal inclusion cysts are not the same as sebaceous cysts  Cysts may remain stable or progressively enlarge over time  There are no reliable predictive factors to tell if an epidermal inclusion cyst will enlarge, become inflamed, or remain quiescent  Infected cysts tend to become larger, turn red, and are more noticeable to the patient  There may be accompanying pain and discomfort  What causes epidermal inclusion cysts? Epidermal inclusion cysts often appear out of the blue and are not contagious  They are due to a proliferation of epidermal cells within the dermis and are more common in men than women  They occur more frequently in patients in their 20s to 45s  Epidermal inclusion cysts by themselves are usually not inherited, but they can be hereditary in rare syndromes such as Lazo syndrome, nodular elastosis with cysts and comedones (Favre-Racouchot syndrome), and basal cell nevus syndrome (Gorlin syndrome)  Elderly patients with chronic sun-damaged skin areas have a higher likelihood of developing epidermoid cysts  They often occur in areas where hair follicles have been inflamed or repeatedly irritated are more frequent in patients with acne vulgaris  In the  period, they are called milia  Patients on BRAF inhibitors such as imiquimod and cyclosporine have a higher incidence of epidermoid cysts of the face  How do we diagnose an epidermal inclusion cyst?  Epidermoid inclusion cysts are often diagnosed by history and physical exam  There is usually no need for biopsy prior to removal   Radiographic and laboratory exams, such as ultrasound studies, are unnecessary and not typically ordered unless the practitioner suspects a genetic condition  What is the treatment for an epidermal inclusion cyst?  Inflamed, uninfected epidermal inclusion cysts rarely resolve spontaneously without therapy or surgical intervention   Treatment is not emergent unless desired by the patient  Definitive treatment is via surgical excision with walls intact  This method will prevent recurrence  This is best done when the cyst is not inflamed, to decrease the probability of rupture during surgery  - A local anesthetic will be injected around the cyst  - A small incision is made in the skin overlying the cyst, and contents are expressed  - The incision is repaired with sutures    Another option is to use a 4mm punch biopsy with cyst extraction through the defect  Incision and drainage is often needed if the cyst is infected or inflamed  If there is surrounding cellulitis, oral antibiotic therapy may be necessary  The common agents used target methicillin sensitive Staphylococcal aureus and methicillin resistant S aureus in areas of high prevalence

## 2020-08-24 ENCOUNTER — OFFICE VISIT (OUTPATIENT)
Dept: DERMATOLOGY | Facility: CLINIC | Age: 60
End: 2020-08-24
Payer: COMMERCIAL

## 2020-08-24 VITALS — WEIGHT: 220 LBS | TEMPERATURE: 98 F | BODY MASS INDEX: 31.5 KG/M2 | HEIGHT: 70 IN

## 2020-08-24 DIAGNOSIS — D48.9 NEOPLASM OF UNCERTAIN BEHAVIOR: Primary | ICD-10-CM

## 2020-08-24 PROCEDURE — 88304 TISSUE EXAM BY PATHOLOGIST: CPT | Performed by: STUDENT IN AN ORGANIZED HEALTH CARE EDUCATION/TRAINING PROGRAM

## 2020-08-24 PROCEDURE — 3008F BODY MASS INDEX DOCD: CPT | Performed by: STUDENT IN AN ORGANIZED HEALTH CARE EDUCATION/TRAINING PROGRAM

## 2020-08-24 PROCEDURE — 12031 INTMD RPR S/A/T/EXT 2.5 CM/<: CPT | Performed by: STUDENT IN AN ORGANIZED HEALTH CARE EDUCATION/TRAINING PROGRAM

## 2020-08-24 PROCEDURE — 11422 EXC H-F-NK-SP B9+MARG 1.1-2: CPT | Performed by: STUDENT IN AN ORGANIZED HEALTH CARE EDUCATION/TRAINING PROGRAM

## 2020-08-24 NOTE — PROGRESS NOTES
PROCEDURE:  EXCISION WITH INTERMEDIATE LAYERED CLOSURE     Attending: Dr Thurston  Nurse: Krystian Shook    Pre-Op Diagnosis: Neoplasm of uncertain behavior, favor Pilar Cyst  Post-Op Diagnosis: Same   Benign versus Malignant: Benign      Lesion Anatomic Location: Left vertex scalp   Pre-op size:1 8 x 1 8 cm  Size of defect:  2 cm, margin: enucleation  Final repaired wound length:  2 cm    Written and verbal, witnessed informed consent was obtained  I discussed that excision is a method of removing lesions both benign and malignant lesions  A portion of normal skin is often taken to ensure completeness of removal   I reviewed that procedure will include numbing the area,  cutting around and under defect, undermining tissue, and closing the wound with sutures both inside and out  These sutures are usually removed in 7 to 14 days  Risks (bleeding, pain, infection, scarring, recurrence) and benefits discussed  It was discussed with patient that every effort is made to minimize scar, but scarring is influenced also by extrinsic factor such as location, age and genetics  Time Out: performed:  yes  Correct patient: yes  Correct site per Clinic Report: yes  Correct site per Patient Report: yes    LOCAL ANESTHESIA: 1% xylocaine with epi     DESCRIPTION OF PROCEDURE: The patient was brought back into the procedure room, anesthetized locally, prepped and draped in the usual fashion  Using a #15 blade with a scalpel, the lesion was excised in elliptical fashion  The wound was  undermined in the  fascial plane  Hemostasis was achieved with light electrocoagulation  Purpose of undermining was to decrease wound tension and facilitate closure      The wound was closed with subcutaneous sutures as follows:    Deep suture:4-0 Vicryl  Deep suture type: Buried horizontal mattress    Epidermal edge closure was accomplished with superficial sutures as follows:    Superficial suture: 5-0 Prolene  Superficial suture type: Running Locked    Estimated blood loss less than 3ml  The patient tolerated the procedure well without any complications  The wound was cleaned with sterile saline, dried off, surgical vaseline ointment was applied, and the wound was covered  A pressure dressing was applied for stabilization and light pressure  The patient was given detailed oral and written instructions on postoperative care as detailed in consent  The patient left in good medical condition  POSTOP DISCUSSION DISCUSSION AND INSTRUCTIONS FOR PATIENT      Rationale for Procedure  A skin excision allows the dermatologist to remove a lesion  The procedure involves a local numbing medication and removing the entire lesion  Typically, the lesion is being removed because it is atypical, traumatized, or for significant appearance reasons  The area will be open like a brush burn and allowed to heal    There will be no sutures  Tissue is sent to pathologist who will reconfirm diagnosis and verify completeness of lesion removal     Description of Procedure  We would like to perform a skin excision today  A local anesthetic, similar to the kind that a dentist uses when filling a cavity, will be injected with a very small needle into the skin area to be sampled  The injected skin and tissue underneath should go to sleep and become numbed so that no further pain should be felt  A scalpel will be used to cut around the lesion and tissue will be submitted to pathology for examination  Depending on the diagnosis the lesion will be excised with a certain amount of normal skin to help assure completeness of lesion removal   The physician will discuss in advance the anticipated size and extent of removal    Bleeding will occur, but it will stopped with direct pressure, sutures, and electrocautery  Surgical Vaseline-type ointment will also applied after the procedure to help create a barrier between the wound and the outside world        Risks and Potential Complications  The advantage of a skin excision is that it allows us to remove a problem lesion quickly  Although this usually permits the lesion to heal as soon as possible with the least scarring, there are some risks and potential complications that include but are not limited to the following:  - Some bleeding is normal at time of procedure and some bleeding on gauze is normal  the first few days after surgery  Profuse bleeding and bleeding with swelling and pain should be reported as detailed  below  - Infection is uncommon in skin surgery  Infection should be reported and is indicated by pain, redness, and discharge of purulent material   - Some dull to at time sharp pain could occur initially the day after surgery  Persistent pain or increasing pain days after surgery is not expected and should be reported  - Every effort is made to minimize scar, but location, size, and genetics do play a role in scar appearance  A surgical wound does not achieve its optimal appearance until 6 months  There are several treatments available if scarring would be problematic including scar creams, silicone pad, laser and scar revision   - Skin discoloration can occur especially in people of color  Its important to avoid sun on wound in first 6 months after surgery  Treatment is available if pigment is problematic   - Incomplete removal of the lesion or recurrence of lesion can occur and this would then require further treatment and more surgery   - Nerve Damage/Numbness/Loss of Function is very rare, but is most likely to occur if lesion is large or if it is in a high risk location  - Allergic Reaction to lidocaine is rare  More commonly,  epinephrine is used  with the lidocaine  Occasionally, epinephrine (aka adrenalin) may cause a brief  feeling of anxiety or jitteriness  - The person at the microscope  (pathologist) may provide additional information that was unexpected   This unexpected finding could provoke the need for additional treatment or evaluation  What You Will Need to Do After the Procedure  1  Keep the area clean and dry the first day  Try NOT to remove the bandage for the first day  2  Gently clean the area with soap and water and apply Vaseline ointment (this is over the counter and not a prescription) to the excision  site for up to 2 weeks  3  Apply a clean appropriately sized bandage to area  Gauze and paper tape are recommended for sensitive skin  4  Return for suture removal as instructed (generally 1 week for the face, 2 weeks for the body)  5  Take Acetaminophen (Tylenol) for discomfort, if no contraindications  Do NOT take Ibuprofen or aspirin unless specifically told to do so by your Dermatologist because these medications can make bleeding worse  6  Call our office immediately for signs of infection: fever, chills, increased redness, warmth, tenderness, discomfort/pain, or pus or foul smell coming from the wound  If bleeding is noticed, place a clean cloth over the area and apply firm pressure for thirty minutes  Check the wound ONLY after 30 minutes of direct pressure; do not cheat and sneak a peak, as that does not count  If bleeding persists after 30 minutes of legitimate direct pressure, then try one more round of direct pressure for an additional 10 minutes to the area  Should the bleeding become heavier or not stop after the second attempt, call [facility] directly at [phone] (SKIN) or, if after hours, go to your local Emergency Room/Emergency Department        Scribe Attestation    I,:   Marck Rubi am acting as a scribe while in the presence of the attending physician :        I,:   Flavio Holloway MD personally performed the services described in this documentation    as scribed in my presence :

## 2020-08-24 NOTE — PATIENT INSTRUCTIONS
POSTOP DISCUSSION DISCUSSION AND INSTRUCTIONS FOR PATIENT      Rationale for Procedure  A skin excision allows the dermatologist to remove a lesion  The procedure involves a local numbing medication and removing the entire lesion  Typically, the lesion is being removed because it is atypical, traumatized, or for significant appearance reasons  The area will be open like a brush burn and allowed to heal    There will be no sutures  Tissue is sent to pathologist who will reconfirm diagnosis and verify completeness of lesion removal     Description of Procedure  We would like to perform a skin excision today  A local anesthetic, similar to the kind that a dentist uses when filling a cavity, will be injected with a very small needle into the skin area to be sampled  The injected skin and tissue underneath should go to sleep and become numbed so that no further pain should be felt  A scalpel will be used to cut around the lesion and tissue will be submitted to pathology for examination  Depending on the diagnosis the lesion will be excised with a certain amount of normal skin to help assure completeness of lesion removal   The physician will discuss in advance the anticipated size and extent of removal    Bleeding will occur, but it will stopped with direct pressure, sutures, and electrocautery  Surgical Vaseline-type ointment will also applied after the procedure to help create a barrier between the wound and the outside world  Risks and Potential Complications  The advantage of a skin excision is that it allows us to remove a problem lesion quickly  Although this usually permits the lesion to heal as soon as possible with the least scarring, there are some risks and potential complications that include but are not limited to the following:  - Some bleeding is normal at time of procedure and some bleeding on gauze is normal  the first few days after surgery    Profuse bleeding and bleeding with swelling and pain should be reported as detailed  below  - Infection is uncommon in skin surgery  Infection should be reported and is indicated by pain, redness, and discharge of purulent material   - Some dull to at time sharp pain could occur initially the day after surgery  Persistent pain or increasing pain days after surgery is not expected and should be reported  - Every effort is made to minimize scar, but location, size, and genetics do play a role in scar appearance  A surgical wound does not achieve its optimal appearance until 6 months  There are several treatments available if scarring would be problematic including scar creams, silicone pad, laser and scar revision   - Skin discoloration can occur especially in people of color  Its important to avoid sun on wound in first 6 months after surgery  Treatment is available if pigment is problematic   - Incomplete removal of the lesion or recurrence of lesion can occur and this would then require further treatment and more surgery   - Nerve Damage/Numbness/Loss of Function is very rare, but is most likely to occur if lesion is large or if it is in a high risk location  - Allergic Reaction to lidocaine is rare  More commonly,  epinephrine is used  with the lidocaine  Occasionally, epinephrine (aka adrenalin) may cause a brief  feeling of anxiety or jitteriness  - The person at the microscope  (pathologist) may provide additional information that was unexpected  This unexpected finding could provoke the need for additional treatment or evaluation  What You Will Need to Do After the Procedure  1  Keep the area clean and dry the first day  Try NOT to remove the bandage for the first day  2  Gently clean the area with soap and water and apply Vaseline ointment (this is over the counter and not a prescription) to the excision  site for up to 2 weeks  3  Apply a clean appropriately sized bandage to area    Gauze and paper tape are recommended for sensitive skin   4  Return for suture removal as instructed (generally 1 week for the face, 2 weeks for the body)  5  Take Acetaminophen (Tylenol) for discomfort, if no contraindications  Do NOT take Ibuprofen or aspirin unless specifically told to do so by your Dermatologist because these medications can make bleeding worse  6  Call our office immediately for signs of infection: fever, chills, increased redness, warmth, tenderness, discomfort/pain, or pus or foul smell coming from the wound  If bleeding is noticed, place a clean cloth over the area and apply firm pressure for thirty minutes  Check the wound ONLY after 30 minutes of direct pressure; do not cheat and sneak a peak, as that does not count  If bleeding persists after 30 minutes of legitimate direct pressure, then try one more round of direct pressure for an additional 10 minutes to the area  Should the bleeding become heavier or not stop after the second attempt, call [facility] directly at [phone] (SKIN) or, if after hours, go to your local Emergency Room/Emergency Department

## 2020-08-31 NOTE — RESULT ENCOUNTER NOTE
Called patient to discuss benign pathology results  Unable to reach but left message with benign results showing scalp cyst and no need for further treatment  Left number for call back if any questions

## 2020-09-08 ENCOUNTER — OFFICE VISIT (OUTPATIENT)
Dept: DERMATOLOGY | Facility: CLINIC | Age: 60
End: 2020-09-08

## 2020-09-08 DIAGNOSIS — Z48.02 ENCOUNTER FOR REMOVAL OF SUTURES: Primary | ICD-10-CM

## 2020-09-08 PROCEDURE — RECHECK: Performed by: DERMATOLOGY

## 2020-09-08 NOTE — PROGRESS NOTES
Suture removal    Date/Time: 9/8/2020 9:31 AM  Performed by: Lita Mcclure RN  Authorized by: Zohreh Tilley MD     Patient location:  Clinic  Consent:     Consent obtained:  Verbal    Consent given by:  Patient  Universal protocol:     Patient identity confirmed:  Verbally with patient  Location:     Laterality:  Left    Location:  Head/neck    Head/neck location:  Scalp  Procedure details: Tools used:  Scissors and scalpel    Wound appearance:  No sign(s) of infection, good wound healing and clean    Sutures removed: running  Post-procedure details:     Post-removal:  No dressing applied    Patient tolerance of procedure:   Tolerated well, no immediate complications      Scribe Attestation    I,:   Lita Mcclure RN am acting as a scribe while in the presence of the attending physician :        I,:   Zohreh Tilley MD personally performed the services described in this documentation    as scribed in my presence :

## 2021-06-09 ENCOUNTER — TELEPHONE (OUTPATIENT)
Dept: INTERNAL MEDICINE CLINIC | Facility: CLINIC | Age: 61
End: 2021-06-09

## 2021-06-09 NOTE — TELEPHONE ENCOUNTER
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

## 2021-10-05 ENCOUNTER — OFFICE VISIT (OUTPATIENT)
Dept: INTERNAL MEDICINE CLINIC | Facility: CLINIC | Age: 61
End: 2021-10-05
Payer: COMMERCIAL

## 2021-10-05 VITALS
WEIGHT: 219.2 LBS | HEIGHT: 70 IN | SYSTOLIC BLOOD PRESSURE: 136 MMHG | DIASTOLIC BLOOD PRESSURE: 70 MMHG | HEART RATE: 68 BPM | OXYGEN SATURATION: 98 % | BODY MASS INDEX: 31.38 KG/M2 | TEMPERATURE: 96.5 F

## 2021-10-05 DIAGNOSIS — Z12.5 SCREENING FOR PROSTATE CANCER: ICD-10-CM

## 2021-10-05 DIAGNOSIS — N52.8 OTHER MALE ERECTILE DYSFUNCTION: ICD-10-CM

## 2021-10-05 DIAGNOSIS — Z13.0 SCREENING FOR DEFICIENCY ANEMIA: ICD-10-CM

## 2021-10-05 DIAGNOSIS — Z00.00 ANNUAL PHYSICAL EXAM: Primary | ICD-10-CM

## 2021-10-05 PROCEDURE — 1036F TOBACCO NON-USER: CPT | Performed by: NURSE PRACTITIONER

## 2021-10-05 PROCEDURE — 3725F SCREEN DEPRESSION PERFORMED: CPT | Performed by: NURSE PRACTITIONER

## 2021-10-05 PROCEDURE — 3008F BODY MASS INDEX DOCD: CPT | Performed by: NURSE PRACTITIONER

## 2021-10-05 PROCEDURE — 99396 PREV VISIT EST AGE 40-64: CPT | Performed by: NURSE PRACTITIONER

## 2021-10-05 RX ORDER — SILDENAFIL 50 MG/1
50 TABLET, FILM COATED ORAL DAILY PRN
Qty: 10 TABLET | Refills: 1 | Status: SHIPPED | OUTPATIENT
Start: 2021-10-05

## 2021-10-13 LAB
ALBUMIN SERPL-MCNC: 4 G/DL (ref 3.6–5.1)
ALBUMIN/GLOB SERPL: 1.4 (CALC) (ref 1–2.5)
ALP SERPL-CCNC: 32 U/L (ref 35–144)
ALT SERPL-CCNC: 15 U/L (ref 9–46)
AST SERPL-CCNC: 15 U/L (ref 10–35)
BILIRUB SERPL-MCNC: 0.5 MG/DL (ref 0.2–1.2)
BUN SERPL-MCNC: 13 MG/DL (ref 7–25)
BUN/CREAT SERPL: ABNORMAL (CALC) (ref 6–22)
CALCIUM SERPL-MCNC: 9 MG/DL (ref 8.6–10.3)
CHLORIDE SERPL-SCNC: 100 MMOL/L (ref 98–110)
CHOLEST SERPL-MCNC: 150 MG/DL
CHOLEST/HDLC SERPL: 2.8 (CALC)
CO2 SERPL-SCNC: 30 MMOL/L (ref 20–32)
CREAT SERPL-MCNC: 0.94 MG/DL (ref 0.7–1.25)
GLOBULIN SER CALC-MCNC: 2.9 G/DL (CALC) (ref 1.9–3.7)
GLUCOSE SERPL-MCNC: 102 MG/DL (ref 65–99)
HDLC SERPL-MCNC: 54 MG/DL
LDLC SERPL CALC-MCNC: 82 MG/DL (CALC)
NONHDLC SERPL-MCNC: 96 MG/DL (CALC)
POTASSIUM SERPL-SCNC: 4.3 MMOL/L (ref 3.5–5.3)
PROT SERPL-MCNC: 6.9 G/DL (ref 6.1–8.1)
PSA SERPL-MCNC: 0.68 NG/ML
SL AMB EGFR AFRICAN AMERICAN: 101 ML/MIN/1.73M2
SL AMB EGFR NON AFRICAN AMERICAN: 87 ML/MIN/1.73M2
SODIUM SERPL-SCNC: 136 MMOL/L (ref 135–146)
TRIGL SERPL-MCNC: 61 MG/DL

## 2022-02-08 ENCOUNTER — TELEPHONE (OUTPATIENT)
Dept: INTERNAL MEDICINE CLINIC | Facility: CLINIC | Age: 62
End: 2022-02-08

## 2022-03-23 ENCOUNTER — OFFICE VISIT (OUTPATIENT)
Dept: OBGYN CLINIC | Facility: CLINIC | Age: 62
End: 2022-03-23
Payer: COMMERCIAL

## 2022-03-23 ENCOUNTER — TELEPHONE (OUTPATIENT)
Dept: OBGYN CLINIC | Facility: CLINIC | Age: 62
End: 2022-03-23

## 2022-03-23 ENCOUNTER — HOSPITAL ENCOUNTER (OUTPATIENT)
Dept: RADIOLOGY | Facility: HOSPITAL | Age: 62
Discharge: HOME | End: 2022-03-23
Payer: COMMERCIAL

## 2022-03-23 VITALS — OXYGEN SATURATION: 98 % | HEIGHT: 70 IN | BODY MASS INDEX: 32.64 KG/M2 | WEIGHT: 228 LBS | HEART RATE: 82 BPM

## 2022-03-23 DIAGNOSIS — M25.561 RIGHT KNEE PAIN, UNSPECIFIED CHRONICITY: ICD-10-CM

## 2022-03-23 DIAGNOSIS — M16.11 PRIMARY OSTEOARTHRITIS OF RIGHT HIP: ICD-10-CM

## 2022-03-23 DIAGNOSIS — M25.551 PAIN IN RIGHT HIP: ICD-10-CM

## 2022-03-23 DIAGNOSIS — M17.11 PRIMARY OSTEOARTHRITIS OF RIGHT KNEE: Primary | ICD-10-CM

## 2022-03-23 PROCEDURE — 73562 X-RAY EXAM OF KNEE 3: CPT

## 2022-03-23 PROCEDURE — 1036F TOBACCO NON-USER: CPT | Performed by: PHYSICIAN ASSISTANT

## 2022-03-23 PROCEDURE — 99203 OFFICE O/P NEW LOW 30 MIN: CPT | Performed by: PHYSICIAN ASSISTANT

## 2022-03-23 PROCEDURE — 73502 X-RAY EXAM HIP UNI 2-3 VIEWS: CPT

## 2022-03-23 PROCEDURE — 3008F BODY MASS INDEX DOCD: CPT | Performed by: PHYSICIAN ASSISTANT

## 2022-03-23 NOTE — PROGRESS NOTES
Assessment/Plan   Diagnoses and all orders for this visit:    Right hip osteoarthritis  - Follow up with Dr Morris Hernandez for an US guided hip injection  - Follow up with Dr Pablo Beth in 3 months    Right knee osteoarthritis  - Offered knee injection  He would prefer to just do the hip at this time since that is more bothersome  - Ice as needed  - Follow up with Dr Pablo Beth in 3 months            Subjective   Patient ID: Luke Palomino is a 64 y o  male  Vitals:    03/23/22 1826   Pulse: 82   SpO2: 98%     61yo male comes in for an evaluation of his right hip and right knee  He has been having ongoing pain in both: the hip since about October and the knee since the 1990's  Right now the hip is what bothers him more  He is doing a walking program and has lost a lot of weight with his diet  Since losing the weight, his joints feel a little better  He tried taking NSAIDs, but got no relief  He is now taking glucosamine which helps him more  The pain is dull in character, mild in severity, pain does not radiate and is not associated with numbness  No catching, popping, or locking            The following portions of the patient's history were reviewed and updated as appropriate: allergies, current medications, past family history, past medical history, past social history, past surgical history and problem list     Review of Systems  Ortho Exam  Past Medical History:   Diagnosis Date    Blood type A+     Other male erectile dysfunction 11/27/2018     Past Surgical History:   Procedure Laterality Date    HERNIA REPAIR Left     twice repaired in past    TONSILLECTOMY       Family History   Problem Relation Age of Onset    Stroke Father 66    Diabetes Father 76    Alzheimer's disease Father     Heart attack Father         dx'd in 76s    Alcohol abuse Neg Hx     Substance Abuse Neg Hx     Mental illness Neg Hx     Depression Neg Hx      Social History     Occupational History    Occupation: self-employed Comment: owns 2 businesses including firearm [address]   Tobacco Use    Smoking status: Never Smoker    Smokeless tobacco: Never Used   Vaping Use    Vaping Use: Never used   Substance and Sexual Activity    Alcohol use: Yes     Comment: occasional    Drug use: No    Sexual activity: Not on file       Review of Systems   Constitutional: Negative  HENT: Negative  Eyes: Negative  Respiratory: Negative  Cardiovascular: Negative  Gastrointestinal: Negative  Endocrine: Negative  Genitourinary: Negative  Musculoskeletal: As below      Allergic/Immunologic: Negative  Neurological: Negative  Hematological: Negative  Psychiatric/Behavioral: Negative  Objective   Physical Exam    · Constitutional: Awake, Alert, Oriented  · Eyes: EOMI  · Psych: Mood and affect appropriate  · Heart: regular rate   · Lungs: No audible wheezing  · Abdomen: No guarding  · Lymph: no lymphedema   right Knee:  - Appearance   No swelling, discoloration, deformity, or ecchymosis  - Effusion   none  - Palpation   No tenderness about the medial / lateral joint line, patella, patellar tendon, MCL, LCL, hamstrings, or medial / lateral tibial plateau   - ROM   Extension: 10 and Flexion: 120  - Special Tests   Jose's Test negative, Lachman's Test negative, Anterior Drawer Test negative, Posterior Drawer Test negative, Valgus Stress Test negative, Varus Stress Test negative and Patellar apprehension negative  - Motor   normal 5/5 in all planes  - NVI distally       right hip:  - Appearance   No rash, erythema, or ecchymosis  - Tenderness   No tenderness about the SI joint, iliac crest, anterior hip, or greater trochanter  - ROM   Flexion 110, ER 20, IR 5   - Special tests   + pain with log rolling   - NVI distally        I have personally reviewed pertinent films in PACS and my interpretation is Significant OA of the knee with osteophyte formation    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

## 2022-03-23 NOTE — TELEPHONE ENCOUNTER
Patient was seen by Vinh Bee and would like for him to be scheduled for a fl guided inj for his right hip  He is following up with Dr Jorge Stern in 3 months  Best contact number is the work phone number listed  Thank you!

## 2022-04-08 ENCOUNTER — OFFICE VISIT (OUTPATIENT)
Dept: OBGYN CLINIC | Facility: CLINIC | Age: 62
End: 2022-04-08
Payer: COMMERCIAL

## 2022-04-08 VITALS — HEIGHT: 70 IN | BODY MASS INDEX: 32.64 KG/M2 | WEIGHT: 228 LBS

## 2022-04-08 DIAGNOSIS — M16.11 PRIMARY OSTEOARTHRITIS OF ONE HIP, RIGHT: Primary | ICD-10-CM

## 2022-04-08 PROCEDURE — 20611 DRAIN/INJ JOINT/BURSA W/US: CPT | Performed by: PHYSICAL MEDICINE & REHABILITATION

## 2022-04-08 PROCEDURE — 99214 OFFICE O/P EST MOD 30 MIN: CPT | Performed by: PHYSICAL MEDICINE & REHABILITATION

## 2022-04-08 PROCEDURE — 3008F BODY MASS INDEX DOCD: CPT | Performed by: PHYSICAL MEDICINE & REHABILITATION

## 2022-04-08 PROCEDURE — 1036F TOBACCO NON-USER: CPT | Performed by: PHYSICAL MEDICINE & REHABILITATION

## 2022-04-08 RX ORDER — TRIAMCINOLONE ACETONIDE 40 MG/ML
40 INJECTION, SUSPENSION INTRA-ARTICULAR; INTRAMUSCULAR
Status: COMPLETED | OUTPATIENT
Start: 2022-04-08 | End: 2022-04-08

## 2022-04-08 RX ORDER — LIDOCAINE HYDROCHLORIDE 10 MG/ML
3 INJECTION, SOLUTION INFILTRATION; PERINEURAL
Status: COMPLETED | OUTPATIENT
Start: 2022-04-08 | End: 2022-04-08

## 2022-04-08 RX ADMIN — LIDOCAINE HYDROCHLORIDE 3 ML: 10 INJECTION, SOLUTION INFILTRATION; PERINEURAL at 07:54

## 2022-04-08 RX ADMIN — TRIAMCINOLONE ACETONIDE 40 MG: 40 INJECTION, SUSPENSION INTRA-ARTICULAR; INTRAMUSCULAR at 07:54

## 2022-04-08 NOTE — PROGRESS NOTES
1  Primary osteoarthritis of one hip, right  Large joint arthrocentesis: R hip joint     Orders Placed This Encounter   Procedures    Large joint arthrocentesis: R hip joint        Impression: This is a patient referred by Ale BENEDICT with right hip osteoarthritis  The patient is a good candidate for ultrasound-guided right hip joint steroid/anesthetic injection  Please see procedure note below  Patient tolerated the procedure and had immediate relief of symptoms  Can consider repeat injection in 3 months if patient does well  I will see him back in four weeks to reassess if needed  Imaging Studies (I personally reviewed images in PACS and report):  Right hip x-rays most recent to this encounter reviewed  These images show mild-moderate osteoarthritis of the right hip joint as evidence by subchondral sclerosis, acetabular osteophyte and offset of femoral head and neck junction  The patient's pertinent emergency department/urgent care/referring physician's notes were reviewed  Return in about 4 weeks (around 5/6/2022), or if symptoms worsen or fail to improve  Patient is in agreement with the above plan  HPI:  She Mendez is a 64 y o  male  who presents for evaluation of   Chief Complaint   Patient presents with    Right Hip - Pain       Onset/Mechanism:  Chronic pain since last year after doing something in the bedroom  Location:  Lateral hip and anterior hip  Radiation:  As above  Provocative:  Pitting  Severity:  Not improving  Associated Symptoms:  Knee pain  Treatment so far:  No recent treatment      Following history reviewed and updated:  Past Medical History:   Diagnosis Date    Blood type A+     Other male erectile dysfunction 11/27/2018     Past Surgical History:   Procedure Laterality Date    HERNIA REPAIR Left     twice repaired in past    TONSILLECTOMY       Social History   Social History     Substance and Sexual Activity   Alcohol Use Yes    Comment: occasional Social History     Substance and Sexual Activity   Drug Use No     Social History     Tobacco Use   Smoking Status Never Smoker   Smokeless Tobacco Never Used     Family History   Problem Relation Age of Onset    Stroke Father 66    Diabetes Father 76    Alzheimer's disease Father     Heart attack Father         dx'd in 76s    Alcohol abuse Neg Hx     Substance Abuse Neg Hx     Mental illness Neg Hx     Depression Neg Hx      No Known Allergies     Constitutional:  Ht 5' 10" (1 778 m)   Wt 103 kg (228 lb)   BMI 32 71 kg/m²    General: NAD  Eyes: Anicteric sclerae  Neck: Supple  Lungs: Unlabored breathing  Cardiovascular: No lower extremity edema  Skin: Intact without erythema  Neurologic: Sensation intact to light touch  Psychiatric: Mood and affect are appropriate  Right Hip Exam     Tenderness   The patient is experiencing tenderness in the anterior  Range of Motion   Internal rotation: abnormal     Other   Erythema: absent  Scars: absent  Sensation: normal  Pulse: present    Comments:  Positive logroll and Stinchfield test              Large joint arthrocentesis: R hip joint  Universal Protocol:  Procedure performed by:  Consent: Verbal consent obtained  Written consent not obtained    Consent given by: patient  Timeout called at: 4/8/2022 7:53 AM   Patient understanding: patient states understanding of the procedure being performed  Site marked: the operative site was marked  Supporting Documentation  Indications: pain and diagnostic evaluation   Procedure Details  Location: hip - R hip joint  Ultrasound guidance: yes (US guidance was used to find the area of interest )  Approach: anterolateral  Medications administered: 40 mg triamcinolone acetonide 40 mg/mL; 3 mL lidocaine 1 %    Patient tolerance: patient tolerated the procedure well with no immediate complications  Dressing:  Sterile dressing applied    The right hip joint was visualized with ultrasound and injected with steroid/anesthetic solution as indicated  Prior to the injection, the ultrasound was used to evaluate for any neural or vascular structures  Care was taken to avoid these structures  The images (and video if taken) were saved to the [employer] ultrasound system  Prior to the procedure, the patient was informed of the following risks in layman terms:    - Risk of bleeding since a needle is involved  - Risk of infection (1/10,000 chance as per recent studies)  Signs/symptoms were discussed and they would prompt an urgent evaluation at an emergency department   - Risk of pigmentation or skin dimpling in the skin (2-3% chance as per recent studies) from the steroid  - Risk of increased pain from steroid flare (1% chance as per recent studies) that typically lasts 24-48 hours  - Risk of increased blood sugars from the steroid medication that can last for a few weeks  If the patient is a diabetic or pre-diabetic, they were encouraged to closely monitor their blood sugars and discuss with PCP if elevated more than usual or if having symptoms  After going over these risks, we decided that the benefits outweigh the risks and proceeded with the procedure

## 2022-06-13 ENCOUNTER — VBI (OUTPATIENT)
Dept: ADMINISTRATIVE | Facility: OTHER | Age: 62
End: 2022-06-13

## 2022-08-04 ENCOUNTER — OFFICE VISIT (OUTPATIENT)
Dept: INTERNAL MEDICINE CLINIC | Facility: CLINIC | Age: 62
End: 2022-08-04
Payer: COMMERCIAL

## 2022-08-04 VITALS
HEIGHT: 70 IN | TEMPERATURE: 97.3 F | OXYGEN SATURATION: 98 % | HEART RATE: 71 BPM | DIASTOLIC BLOOD PRESSURE: 86 MMHG | WEIGHT: 218 LBS | SYSTOLIC BLOOD PRESSURE: 140 MMHG | BODY MASS INDEX: 31.21 KG/M2

## 2022-08-04 DIAGNOSIS — R21 RASH: Primary | ICD-10-CM

## 2022-08-04 PROCEDURE — 99213 OFFICE O/P EST LOW 20 MIN: CPT | Performed by: NURSE PRACTITIONER

## 2022-08-04 RX ORDER — PREDNISONE 10 MG/1
TABLET ORAL
Qty: 30 TABLET | Refills: 0 | Status: SHIPPED | OUTPATIENT
Start: 2022-08-04

## 2022-08-04 NOTE — PROGRESS NOTES
Assessment/Plan:    Take steroids as prescribed with food  Stop drinking ensure since this was the only change recently  Avoid being in warm weather for extended periods of time, avoid hot showers  Apply cera ve daily  Schedule with dermatology if not improving  Diagnoses and all orders for this visit:    Rash  -     predniSONE 10 mg tablet; Take 4 tablets daily for 4 days then 3 tablets daily for 3 days then 2 tablets daily for 2 days then 1 tablet daily for 1 day then stop  -     Ambulatory Referral to Dermatology; Future          Subjective:      Patient ID: Kecia Jaimes is a 58 y o  male  Gisela Culp is here today with complaints of a rash   Symptoms started about 2 weeks ago   It started out on his abdomen but has now spread to his back, feet, and legs  He denies any new lotions/soaps/detergents  He did recently start drinking ensure which is new for him  The rash is mildly itchy  No pain  He denies associated cold symptoms, fever, or chills  He gets relief with a cold shower  The following portions of the patient's history were reviewed and updated as appropriate: allergies, current medications, past family history, past medical history, past social history, past surgical history and problem list     Review of Systems   Constitutional: Negative for activity change, appetite change, chills and fever  HENT: Negative for congestion and sore throat  Respiratory: Negative for cough  Cardiovascular: Negative for chest pain  Gastrointestinal: Negative for abdominal pain  Musculoskeletal: Negative for myalgias  Skin: Positive for rash  Neurological: Negative for dizziness, light-headedness and headaches  Objective:      /86   Pulse 71   Temp (!) 97 3 °F (36 3 °C)   Ht 5' 10" (1 778 m)   Wt 98 9 kg (218 lb)   SpO2 98%   BMI 31 28 kg/m²          Physical Exam  Vitals reviewed  Constitutional:       Appearance: Normal appearance     HENT:      Head: Normocephalic and atraumatic  Eyes:      Conjunctiva/sclera: Conjunctivae normal    Pulmonary:      Effort: Pulmonary effort is normal    Skin:     General: Skin is warm and dry  Findings: Rash present  Comments: Scattered erythematous papules and macules noted on back, abdomen, and feet  No vesicles noted  Neurological:      Mental Status: He is alert and oriented to person, place, and time     Psychiatric:         Mood and Affect: Mood normal          Behavior: Behavior normal

## 2022-09-09 ENCOUNTER — VBI (OUTPATIENT)
Dept: ADMINISTRATIVE | Facility: OTHER | Age: 62
End: 2022-09-09

## 2022-11-07 ENCOUNTER — TELEPHONE (OUTPATIENT)
Dept: OBGYN CLINIC | Facility: CLINIC | Age: 62
End: 2022-11-07

## 2022-11-07 NOTE — TELEPHONE ENCOUNTER
Per provider, pt to be rescheduled to Tuesday or Friday  Tried to call # on file but no valid #  Sent visit message email for pt to contact us to reschedule

## 2022-12-02 ENCOUNTER — OFFICE VISIT (OUTPATIENT)
Dept: OBGYN CLINIC | Facility: CLINIC | Age: 62
End: 2022-12-02

## 2022-12-02 VITALS
WEIGHT: 218 LBS | DIASTOLIC BLOOD PRESSURE: 85 MMHG | BODY MASS INDEX: 31.21 KG/M2 | SYSTOLIC BLOOD PRESSURE: 152 MMHG | HEIGHT: 70 IN | HEART RATE: 73 BPM

## 2022-12-02 DIAGNOSIS — M16.11 PRIMARY OSTEOARTHRITIS OF ONE HIP, RIGHT: Primary | ICD-10-CM

## 2022-12-02 RX ORDER — TRIAMCINOLONE ACETONIDE 40 MG/ML
80 INJECTION, SUSPENSION INTRA-ARTICULAR; INTRAMUSCULAR
Status: COMPLETED | OUTPATIENT
Start: 2022-12-02 | End: 2022-12-02

## 2022-12-02 RX ORDER — ROPIVACAINE HYDROCHLORIDE 5 MG/ML
10 INJECTION, SOLUTION EPIDURAL; INFILTRATION; PERINEURAL
Status: COMPLETED | OUTPATIENT
Start: 2022-12-02 | End: 2022-12-02

## 2022-12-02 RX ADMIN — TRIAMCINOLONE ACETONIDE 80 MG: 40 INJECTION, SUSPENSION INTRA-ARTICULAR; INTRAMUSCULAR at 11:03

## 2022-12-02 RX ADMIN — ROPIVACAINE HYDROCHLORIDE 10 ML: 5 INJECTION, SOLUTION EPIDURAL; INFILTRATION; PERINEURAL at 11:03

## 2022-12-02 NOTE — PROGRESS NOTES
1  Primary osteoarthritis of one hip, right  Large joint arthrocentesis: R hip joint        Orders Placed This Encounter   Procedures   • Large joint arthrocentesis: R hip joint        Impression:  Patient is here in follow up of right hip osteoarthritis  The patient is a good candidate for repeat ultrasound-guided right hip joint steroid/anesthetic injection  Please see procedure note below  Patient tolerated the procedure and had immediate relief of symptoms       Imaging Studies (I personally reviewed images in PACS and report):  Right hip x-rays most recent to this encounter reviewed  These images show mild-moderate osteoarthritis of the right hip joint as evidence by subchondral sclerosis, acetabular osteophyte and offset of femoral head and neck junction  Return if symptoms worsen or fail to improve  Patient is in agreement with the above plan  HPI:  Garth Castillo is a 58 y o  male  who presents in follow up  Here for   Chief Complaint   Patient presents with   • Right Hip - Follow-up, Injections     Pt reports right hip began to bother him again last month  No pain when sleeping, and pt is still able to walk long distance  Since last visit: See above      Following history reviewed and updated:  Past Medical History:   Diagnosis Date   • Blood type A+    • Other male erectile dysfunction 11/27/2018   • Right ankle injury 11/04/2022     Past Surgical History:   Procedure Laterality Date   • HERNIA REPAIR Left     twice repaired in past   • TONSILLECTOMY       Social History   Social History     Substance and Sexual Activity   Alcohol Use Yes    Comment: occasional     Social History     Substance and Sexual Activity   Drug Use No     Social History     Tobacco Use   Smoking Status Never   Smokeless Tobacco Never     Family History   Problem Relation Age of Onset   • Stroke Father 66   • Diabetes Father 76   • Alzheimer's disease Father    • Heart attack Father         dx'd in 76s   • Alcohol abuse Neg Hx    • Substance Abuse Neg Hx    • Mental illness Neg Hx    • Depression Neg Hx      No Known Allergies     Constitutional:  /85   Pulse 73   Ht 5' 10" (1 778 m)   Wt 98 9 kg (218 lb)   BMI 31 28 kg/m²    General: NAD  Eyes: Clear sclerae  ENT: No inflammation, lesion, or mass of lips  No tracheal deviation  Musculoskeletal: As mentioned below  Integumentary: No visible rashes or skin lesions  Pulmonary/Chest: Effort normal  No respiratory distress  Neuro: CN's grossly intact, PALMA  Psych: Normal affect and judgement  Vascular: WWP  Right Hip Exam     Range of Motion   Internal rotation: abnormal     Other   Erythema: absent  Scars: absent  Sensation: normal  Pulse: present    Comments:  Positive log roll and Stinchfield test              Large joint arthrocentesis: R hip joint  Universal Protocol:  Procedure performed by:  Consent: Verbal consent obtained  Written consent not obtained  Consent given by: patient  Timeout called at: 12/2/2022 11:03 AM   Patient understanding: patient states understanding of the procedure being performed  Site marked: the operative site was marked  Radiology Images displayed and confirmed  If images not available, report reviewed: imaging studies available  Patient identity confirmed: verbally with patient    Supporting Documentation  Indications: pain and diagnostic evaluation   Procedure Details  Location: hip - R hip joint  Ultrasound guidance: yes (US guidance was used to find the area of interest )  Medications administered: 80 mg triamcinolone acetonide 40 mg/mL; 10 mL ropivacaine 0 5 %    Patient tolerance: patient tolerated the procedure well with no immediate complications  Dressing:  Sterile dressing applied    The right hip joint was visualized with ultrasound and injected with steroid/anesthetic solution as indicated  Prior to the injection, the ultrasound was used to evaluate for any neural or vascular structures    Care was taken to avoid these structures  The images (and video if taken) were saved to the [email or web address] ultrasound system  Risks of this procedure include:    - Risk of bleeding since a needle is involved  - Risk of infection (1/10,000 chance as per recent studies)  Signs/symptoms were discussed and they would prompt an urgent evaluation at an emergency department   - Risk of pigmentation or skin dimpling in the skin (2-3% chance as per recent studies) from the steroid  - Risk of increased pain from steroid flare (1% chance as per recent studies) that typically lasts 24-48 hours  - Risk of increased blood sugars from the steroid medication that can last for a few weeks  If the patient is a diabetic or pre-diabetic, they were encouraged to closely monitor their blood sugars and discuss with PCP if elevated more than usual or if having symptoms  We decided that the benefits outweigh the risks and so we proceeded with the procedure

## 2023-10-12 ENCOUNTER — TELEPHONE (OUTPATIENT)
Age: 63
End: 2023-10-12

## 2023-10-12 NOTE — TELEPHONE ENCOUNTER
Caller: Michael Rivero    Doctor: Geovany Dorsey     Reason for call: Received a call he needs to r/s his US for tomorrow     Call back#: [phone]

## 2023-10-17 ENCOUNTER — OFFICE VISIT (OUTPATIENT)
Dept: OBGYN CLINIC | Facility: CLINIC | Age: 63
End: 2023-10-17
Payer: COMMERCIAL

## 2023-10-17 VITALS — SYSTOLIC BLOOD PRESSURE: 147 MMHG | HEART RATE: 85 BPM | DIASTOLIC BLOOD PRESSURE: 75 MMHG

## 2023-10-17 DIAGNOSIS — M16.11 PRIMARY OSTEOARTHRITIS OF ONE HIP, RIGHT: Primary | ICD-10-CM

## 2023-10-17 PROCEDURE — 20611 DRAIN/INJ JOINT/BURSA W/US: CPT | Performed by: PHYSICAL MEDICINE & REHABILITATION

## 2023-10-17 PROCEDURE — 99213 OFFICE O/P EST LOW 20 MIN: CPT | Performed by: PHYSICAL MEDICINE & REHABILITATION

## 2023-10-17 RX ORDER — TRIAMCINOLONE ACETONIDE 40 MG/ML
80 INJECTION, SUSPENSION INTRA-ARTICULAR; INTRAMUSCULAR
Status: COMPLETED | OUTPATIENT
Start: 2023-10-17 | End: 2023-10-17

## 2023-10-17 RX ORDER — ROPIVACAINE HYDROCHLORIDE 5 MG/ML
10 INJECTION, SOLUTION EPIDURAL; INFILTRATION; PERINEURAL
Status: COMPLETED | OUTPATIENT
Start: 2023-10-17 | End: 2023-10-17

## 2023-10-17 RX ADMIN — TRIAMCINOLONE ACETONIDE 80 MG: 40 INJECTION, SUSPENSION INTRA-ARTICULAR; INTRAMUSCULAR at 08:00

## 2023-10-17 RX ADMIN — ROPIVACAINE HYDROCHLORIDE 10 ML: 5 INJECTION, SOLUTION EPIDURAL; INFILTRATION; PERINEURAL at 08:00

## 2024-04-04 ENCOUNTER — OFFICE VISIT (OUTPATIENT)
Dept: OBGYN CLINIC | Facility: CLINIC | Age: 64
End: 2024-04-04
Payer: COMMERCIAL

## 2024-04-04 ENCOUNTER — HOSPITAL ENCOUNTER (OUTPATIENT)
Dept: RADIOLOGY | Facility: HOSPITAL | Age: 64
End: 2024-04-04
Payer: COMMERCIAL

## 2024-04-04 VITALS — HEART RATE: 82 BPM | OXYGEN SATURATION: 99 % | HEIGHT: 70 IN | WEIGHT: 243 LBS | BODY MASS INDEX: 34.79 KG/M2

## 2024-04-04 DIAGNOSIS — M17.11 PRIMARY OSTEOARTHRITIS OF RIGHT KNEE: ICD-10-CM

## 2024-04-04 DIAGNOSIS — M25.561 CHRONIC PAIN OF RIGHT KNEE: Primary | ICD-10-CM

## 2024-04-04 DIAGNOSIS — G89.29 CHRONIC PAIN OF RIGHT KNEE: ICD-10-CM

## 2024-04-04 DIAGNOSIS — G89.29 CHRONIC PAIN OF RIGHT KNEE: Primary | ICD-10-CM

## 2024-04-04 DIAGNOSIS — M25.561 CHRONIC PAIN OF RIGHT KNEE: ICD-10-CM

## 2024-04-04 PROCEDURE — 99214 OFFICE O/P EST MOD 30 MIN: CPT | Performed by: PHYSICIAN ASSISTANT

## 2024-04-04 PROCEDURE — 73562 X-RAY EXAM OF KNEE 3: CPT

## 2024-04-04 PROCEDURE — 20610 DRAIN/INJ JOINT/BURSA W/O US: CPT | Performed by: PHYSICIAN ASSISTANT

## 2024-04-04 RX ORDER — LIDOCAINE HYDROCHLORIDE 20 MG/ML
1 INJECTION, SOLUTION EPIDURAL; INFILTRATION; INTRACAUDAL; PERINEURAL
Status: COMPLETED | OUTPATIENT
Start: 2024-04-04 | End: 2024-04-04

## 2024-04-04 RX ORDER — TRIAMCINOLONE ACETONIDE 40 MG/ML
80 INJECTION, SUSPENSION INTRA-ARTICULAR; INTRAMUSCULAR
Status: COMPLETED | OUTPATIENT
Start: 2024-04-04 | End: 2024-04-04

## 2024-04-04 RX ORDER — BUPIVACAINE HYDROCHLORIDE 5 MG/ML
2 INJECTION, SOLUTION EPIDURAL; INTRACAUDAL
Status: COMPLETED | OUTPATIENT
Start: 2024-04-04 | End: 2024-04-04

## 2024-04-04 RX ADMIN — LIDOCAINE HYDROCHLORIDE 1 ML: 20 INJECTION, SOLUTION EPIDURAL; INFILTRATION; INTRACAUDAL; PERINEURAL at 08:00

## 2024-04-04 RX ADMIN — BUPIVACAINE HYDROCHLORIDE 2 ML: 5 INJECTION, SOLUTION EPIDURAL; INTRACAUDAL at 08:00

## 2024-04-04 RX ADMIN — TRIAMCINOLONE ACETONIDE 80 MG: 40 INJECTION, SUSPENSION INTRA-ARTICULAR; INTRAMUSCULAR at 08:00

## 2024-04-04 NOTE — PROGRESS NOTES
Assessment/Plan   Diagnoses and all orders for this visit:    Acute on Chronic pain of right knee    Primary osteoarthritis of right knee    - Cortisone injection today  - Ice as needed  - Follow up with Dr. Wooten in 3mo          Subjective   Patient ID: Yehuda Trejo is a 63 y.o. male.    Vitals:    04/04/24 0757   Pulse: 82   SpO2: 99%     62yo male comes in for an evaluation of his right knee.  He has been having knee pain on and off since the 1990's.  He was seen for osteoarthritis about a year ago, but declined a cortisone injection at that time.  About 2 weeks ago, he got out of bed, and felt as if the knee gave out.  At that time, he had 8/10 pain and swelling.  Since then, the pain has improved to 4/10 and the swelling has significantly improved.   The pain is dull in character, mild in severity, does not radiate and is not associated with numbness.  Cortisone injections have worked well in his hip.  He requests one for the knee because he is going on a trip soon.          The following portions of the patient's history were reviewed and updated as appropriate: allergies, current medications, past family history, past medical history, past social history, past surgical history, and problem list.    Review of Systems  Ortho Exam  Past Medical History:   Diagnosis Date    Blood type A+     Other male erectile dysfunction 11/27/2018    Right ankle injury 11/04/2022     Past Surgical History:   Procedure Laterality Date    HERNIA REPAIR Left     twice repaired in past    TONSILLECTOMY       Family History   Problem Relation Age of Onset    Stroke Father 78    Diabetes Father 68    Alzheimer's disease Father     Heart attack Father         dx'd in 70s    Alcohol abuse Neg Hx     Substance Abuse Neg Hx     Mental illness Neg Hx     Depression Neg Hx      Social History     Occupational History    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    HENT: Negative.    Eyes: Negative.    Respiratory: Negative.    Cardiovascular: Negative.    Gastrointestinal: Negative.    Endocrine: Negative.    Genitourinary: Negative.    Musculoskeletal: As below..   Allergic/Immunologic: Negative.    Neurological: Negative.    Hematological: Negative.    Psychiatric/Behavioral: Negative.        Objective   Physical Exam    Constitutional: Awake, Alert, Oriented  Eyes: EOMI  Psych: Mood and affect appropriate  Heart: regular rate   Lungs: No audible wheezing  Abdomen: No guarding  Lymph: no lymphedema  right Knee:  Appearance  Swelling, no discoloration, no deformity, no ecchymosis, and no erythema  Effusion  mild  Palpation  No tenderness about the medial / lateral joint line, patella, patellar tendon, MCL, LCL, hamstrings, or medial / lateral tibial plateau.  ROM  Extension: 5 and Flexion: 199  Special Tests  Anterior Drawer Test negative, Posterior Drawer Test negative, Valgus Stress Test negative, Varus Stress Test negative, and Patellar apprehension negative  Able to perform an SLR  Motor  normal 5/5 in all planes  NVI distally    Xrays:  I have personally reviewed pertinent films in PACS and my interpretation is tricompartmental osteoarthritis.      Large joint arthrocentesis: R knee  Universal Protocol:  Consent: Verbal consent obtained.  Risks and benefits: risks, benefits and alternatives were discussed  Consent given by: patient  Time out: Immediately prior to procedure a \"time out\" was called to verify the correct patient, procedure, equipment, support staff and site/side marked as required.  Timeout called at: 4/4/2024 8:28 AM.  Patient understanding: patient states understanding of the procedure " being performed  Site marked: the operative site was marked  Radiology Images displayed and confirmed. If images not available, report reviewed: imaging studies available  Patient identity confirmed: verbally with patient  Supporting Documentation  Indications: pain   Procedure Details  Location: knee - R knee  Needle size: 22 G  Ultrasound guidance: no  Approach: lateral  Medications administered: 2 mL bupivacaine (PF) 0.5 %; 80 mg triamcinolone acetonide 40 mg/mL; 1 mL lidocaine (PF) 2 %    Patient tolerance: patient tolerated the procedure well with no immediate complications  Dressing:  Sterile dressing applied

## 2024-05-28 ENCOUNTER — TELEPHONE (OUTPATIENT)
Dept: OBGYN CLINIC | Facility: CLINIC | Age: 64
End: 2024-05-28

## 2024-07-19 ENCOUNTER — OFFICE VISIT (OUTPATIENT)
Dept: OBGYN CLINIC | Facility: CLINIC | Age: 64
End: 2024-07-19
Payer: COMMERCIAL

## 2024-07-19 VITALS — BODY MASS INDEX: 34.79 KG/M2 | HEIGHT: 70 IN | WEIGHT: 243 LBS

## 2024-07-19 DIAGNOSIS — S80.01XA CONTUSION OF RIGHT KNEE, INITIAL ENCOUNTER: Primary | ICD-10-CM

## 2024-07-19 DIAGNOSIS — M16.11 PRIMARY OSTEOARTHRITIS OF ONE HIP, RIGHT: ICD-10-CM

## 2024-07-19 PROCEDURE — 20610 DRAIN/INJ JOINT/BURSA W/O US: CPT | Performed by: ORTHOPAEDIC SURGERY

## 2024-07-19 PROCEDURE — 99214 OFFICE O/P EST MOD 30 MIN: CPT | Performed by: ORTHOPAEDIC SURGERY

## 2024-07-19 RX ORDER — BUPIVACAINE HYDROCHLORIDE 2.5 MG/ML
2 INJECTION, SOLUTION INFILTRATION; PERINEURAL
Status: COMPLETED | OUTPATIENT
Start: 2024-07-19 | End: 2024-07-19

## 2024-07-19 RX ORDER — TRIAMCINOLONE ACETONIDE 40 MG/ML
80 INJECTION, SUSPENSION INTRA-ARTICULAR; INTRAMUSCULAR
Status: COMPLETED | OUTPATIENT
Start: 2024-07-19 | End: 2024-07-19

## 2024-07-19 RX ADMIN — TRIAMCINOLONE ACETONIDE 80 MG: 40 INJECTION, SUSPENSION INTRA-ARTICULAR; INTRAMUSCULAR at 07:45

## 2024-07-19 RX ADMIN — BUPIVACAINE HYDROCHLORIDE 2 ML: 2.5 INJECTION, SOLUTION INFILTRATION; PERINEURAL at 07:45

## 2024-07-19 NOTE — PROGRESS NOTES
Assessment & Plan     1. Contusion of right knee, initial encounter    2. Primary osteoarthritis of one hip, right      Orders Placed This Encounter   Procedures    Large joint arthrocentesis     64 year old male with acute exacerbation of chronic right knee pain due to known osteoarthritis    Diagnostics reviewed and physical exam performed.  Diagnosis, treatment options and associated risks were discussed with the patient including no treatment, nonsurgical treatment and potential for surgical intervention.  The patient was given the opportunity to ask questions regarding each.  Patient is not a surgical candidate at this time. Recommend non-operative treatments as outlined below:  Injections: Patient received right knee steroid injection today. Tolerated the procedure well. Post injection instructions reviewed including information on glucose monitoring for diabetic patients. Patient aware that they may repeat steroid injection every 3 months if needed.   VISCO discussed today and can be ordered in the future if desired  Medications: Tylenol up to 3000 mg per day, OTC NSAID prn pain, and OTC voltaren gel  PT: Continue home exercises.   Bracing: Continue OTC knee brace.   Activity: Continue activity as tolerated.   Plan for next appt:  Re-evaluation      Return for follow up on an as-needed basis (PRN).    I answered all of the patient's questions during the visit and provided education of the patient's condition during the visit.  The patient verbalized understanding of the information given and agrees with the plan.  This note was dictated using [employer] software.  It may contain errors including improperly dictated words.  Please contact physician directly for any questions.      Subjective:     Patient ID: Yehuda Trejo 64 y.o. male    HPI    HPI: Yehuda Trejo is a 64 y.o. male that presents today for acute exacerbation of chronic right knee pain. He recently exacerbated this pain after multiple falls from a  ladder, first in late March where he hyperextended his knee, and second in mid-April where he landed on a hyperflexed knee. After the first fall he was seen and evaluted by Cole Smith PA-C where he received a cortisone injection on 4/4/2024 which provided him significant relief until his second fall, which resulted in swelling, difficulty ambulating. His pain and dysfunction gradually improved but persists today. He found relief with an OTC compression sleeve. He does not take medication for pain. Pain is aggravated by changing positions while sleeping, ascending and descending stairs. Pain is anterior, constant, rated 5-6/10. He does note a past history of Rheumatoid arthritis with related right hip pain.       The following portions of the patient's history were reviewed and updated as appropriate: allergies, current medications, past family history, past social history, past surgical history and problem list.      Objective:    Review of Systems  Pertinent items are noted in HPI.  All other systems were reviewed and are negative.    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 (Patient not taking: Reported on 7/19/2024), Disp: 30 tablet, Rfl: 0    No Known Allergies    Physical Exam  Ht 5' 10\" (1.778 m)   Wt 110 kg (243 lb)   BMI 34.87 kg/m²   Cons: Appears well.  No apparent distress.  Psych: Alert. Oriented x3.  Mood and affect normal.  Eyes: PERRLA, EOMI  Resp: Normal effort.  No audible wheezing or stridor.  CV: Palpable pulse.  No discernable arrhythmia.  No LE edema.  Lymph:  No palpable cervical, axillary, or inguinal lymphadenopathy.  Skin: Warm.  No palpable masses.  No visible lesions.  Neuro: Normal muscle tone.  Normal and symmetric DTR's.    Right Knee Exam     Tenderness   The patient is experiencing tenderness in the medial joint line.    Range of Motion   Extension:  5   Flexion:  110     Tests   Jose:  Medial - negative Lateral - negative  Varus: negative Valgus: negative    Other   Erythema: absent  Scars: absent  Sensation: normal  Pulse: present  Effusion: effusion present    Comments:    Knee stable at 0, 30, 90 degrees  + Patellar grind  + peripatellar crepitation  TTP lateral PF joint line  Skin intact and well perfused  Sensation intact in DP/SP/Bradshaw/Sa/T nerve distributions  2+ DP & PT pulses  Brisk capillary refill in all toes                Large joint arthrocentesis: R knee  Universal Protocol:  Consent: Verbal consent obtained.  Risks and benefits: risks, benefits and alternatives were discussed  Consent given by: patient  Time out: Immediately prior to procedure a \"time out\" was called to verify the correct patient, procedure, equipment, support staff and site/side marked as required.  Patient " "understanding: patient states understanding of the procedure being performed  Site marked: the operative site was marked  Patient identity confirmed: verbally with patient  Supporting Documentation  Indications: pain and diagnostic evaluation   Procedure Details  Location: knee - R knee  Preparation: Patient was prepped and draped in the usual sterile fashion  Needle size: 22 G  Ultrasound guidance: no  Medications administered: 2 mL bupivacaine 0.25 %; 80 mg triamcinolone acetonide 40 mg/mL    Patient tolerance: patient tolerated the procedure well with no immediate complications  Dressing:  Sterile dressing applied               I have personally reviewed pertinent films in PACS and my interpretation is severe patellofemoral and mild to moderate medial and lateral compartment osteoarthritis with loss of joint space, osteophyte formation.        Scribe Attestation      I,:  Della Huston am acting as a scribe while in the presence of the attending physician.:       I,:  Bhaskar Salazar DO personally performed the services described in this documentation    as scribed in my presence.:                Portions of the record may have been created with voice recognition software.  Occasional wrong word or \"sound a like\" substitutions may have occurred due to the inherent limitations of voice recognition software.  Read the chart carefully and recognize, using context, where substitutions have occurred.  "

## 2024-08-09 ENCOUNTER — TELEPHONE (OUTPATIENT)
Age: 64
End: 2024-08-09

## 2024-08-09 NOTE — TELEPHONE ENCOUNTER
Caller: Patient    Doctor: Dr. Wooten    Reason for call: Patient calling to schedule USGI for his right hip.  He can be reached at the number below.    Call back#: [phone]

## 2024-09-05 ENCOUNTER — TELEPHONE (OUTPATIENT)
Age: 64
End: 2024-09-05

## 2024-09-05 ENCOUNTER — TELEPHONE (OUTPATIENT)
Dept: OBGYN CLINIC | Facility: HOSPITAL | Age: 64
End: 2024-09-05

## 2024-09-05 ENCOUNTER — APPOINTMENT (EMERGENCY)
Dept: CT IMAGING | Facility: HOSPITAL | Age: 64
End: 2024-09-05
Payer: COMMERCIAL

## 2024-09-05 ENCOUNTER — NURSE TRIAGE (OUTPATIENT)
Age: 64
End: 2024-09-05

## 2024-09-05 ENCOUNTER — HOSPITAL ENCOUNTER (OUTPATIENT)
Facility: HOSPITAL | Age: 64
Setting detail: OBSERVATION
Discharge: HOME | End: 2024-09-06
Attending: EMERGENCY MEDICINE | Admitting: STUDENT IN AN ORGANIZED HEALTH CARE EDUCATION/TRAINING PROGRAM
Payer: COMMERCIAL

## 2024-09-05 ENCOUNTER — APPOINTMENT (OUTPATIENT)
Dept: RADIOLOGY | Facility: HOSPITAL | Age: 64
End: 2024-09-05
Payer: COMMERCIAL

## 2024-09-05 ENCOUNTER — APPOINTMENT (OUTPATIENT)
Dept: MRI IMAGING | Facility: HOSPITAL | Age: 64
End: 2024-09-05
Payer: COMMERCIAL

## 2024-09-05 DIAGNOSIS — G45.9 TIA (TRANSIENT ISCHEMIC ATTACK): Primary | ICD-10-CM

## 2024-09-05 DIAGNOSIS — R29.90 STROKE-LIKE SYMPTOMS: ICD-10-CM

## 2024-09-05 DIAGNOSIS — R47.01 APHASIA: ICD-10-CM

## 2024-09-05 PROBLEM — E87.1 HYPONATREMIA: Status: ACTIVE | Noted: 2024-09-05

## 2024-09-05 LAB
ALBUMIN SERPL BCG-MCNC: 4.4 G/DL (ref 3.5–5)
ALP SERPL-CCNC: 34 U/L (ref 34–104)
ALT SERPL W P-5'-P-CCNC: 19 U/L (ref 7–52)
ANION GAP SERPL CALCULATED.3IONS-SCNC: 5 MMOL/L (ref 4–13)
AST SERPL W P-5'-P-CCNC: 23 U/L (ref 13–39)
ATRIAL RATE: 71 BPM
BASOPHILS # BLD AUTO: 0.07 THOUSANDS/ÂΜL (ref 0–0.1)
BASOPHILS NFR BLD AUTO: 1 % (ref 0–1)
BILIRUB SERPL-MCNC: 0.46 MG/DL (ref 0.2–1)
BUN SERPL-MCNC: 11 MG/DL (ref 5–25)
CALCIUM SERPL-MCNC: 9.1 MG/DL (ref 8.4–10.2)
CHLORIDE SERPL-SCNC: 99 MMOL/L (ref 96–108)
CO2 SERPL-SCNC: 29 MMOL/L (ref 21–32)
CREAT SERPL-MCNC: 0.89 MG/DL (ref 0.6–1.3)
EOSINOPHIL # BLD AUTO: 0.07 THOUSAND/ÂΜL (ref 0–0.61)
EOSINOPHIL NFR BLD AUTO: 1 % (ref 0–6)
ERYTHROCYTE [DISTWIDTH] IN BLOOD BY AUTOMATED COUNT: 13.7 % (ref 11.6–15.1)
GFR SERPL CREATININE-BSD FRML MDRD: 90 ML/MIN/1.73SQ M
GLUCOSE SERPL-MCNC: 111 MG/DL (ref 65–140)
HCT VFR BLD AUTO: 46.1 % (ref 36.5–49.3)
HGB BLD-MCNC: 15.3 G/DL (ref 12–17)
IMM GRANULOCYTES # BLD AUTO: 0.1 THOUSAND/UL (ref 0–0.2)
IMM GRANULOCYTES NFR BLD AUTO: 1 % (ref 0–2)
LYMPHOCYTES # BLD AUTO: 1.98 THOUSANDS/ÂΜL (ref 0.6–4.47)
LYMPHOCYTES NFR BLD AUTO: 21 % (ref 14–44)
MCH RBC QN AUTO: 30.2 PG (ref 26.8–34.3)
MCHC RBC AUTO-ENTMCNC: 33.2 G/DL (ref 31.4–37.4)
MCV RBC AUTO: 91 FL (ref 82–98)
MONOCYTES # BLD AUTO: 1.02 THOUSAND/ÂΜL (ref 0.17–1.22)
MONOCYTES NFR BLD AUTO: 11 % (ref 4–12)
NEUTROPHILS # BLD AUTO: 6.24 THOUSANDS/ÂΜL (ref 1.85–7.62)
NEUTS SEG NFR BLD AUTO: 65 % (ref 43–75)
NRBC BLD AUTO-RTO: 0 /100 WBCS
P AXIS: 47 DEGREES
PLATELET # BLD AUTO: 266 THOUSANDS/UL (ref 149–390)
PLATELET # BLD AUTO: 303 THOUSANDS/UL (ref 149–390)
PMV BLD AUTO: 8.9 FL (ref 8.9–12.7)
PMV BLD AUTO: 9.3 FL (ref 8.9–12.7)
POTASSIUM SERPL-SCNC: 4.8 MMOL/L (ref 3.5–5.3)
PR INTERVAL: 156 MS
PROT SERPL-MCNC: 7.9 G/DL (ref 6.4–8.4)
QRS AXIS: -14 DEGREES
QRSD INTERVAL: 94 MS
QT INTERVAL: 420 MS
QTC INTERVAL: 456 MS
RBC # BLD AUTO: 5.07 MILLION/UL (ref 3.88–5.62)
SODIUM SERPL-SCNC: 133 MMOL/L (ref 135–147)
T WAVE AXIS: 44 DEGREES
VENTRICULAR RATE: 71 BPM
WBC # BLD AUTO: 9.48 THOUSAND/UL (ref 4.31–10.16)

## 2024-09-05 PROCEDURE — 70496 CT ANGIOGRAPHY HEAD: CPT

## 2024-09-05 PROCEDURE — 36415 COLL VENOUS BLD VENIPUNCTURE: CPT | Performed by: EMERGENCY MEDICINE

## 2024-09-05 PROCEDURE — 93005 ELECTROCARDIOGRAM TRACING: CPT

## 2024-09-05 PROCEDURE — 99245 OFF/OP CONSLTJ NEW/EST HI 55: CPT | Performed by: STUDENT IN AN ORGANIZED HEALTH CARE EDUCATION/TRAINING PROGRAM

## 2024-09-05 PROCEDURE — 99285 EMERGENCY DEPT VISIT HI MDM: CPT

## 2024-09-05 PROCEDURE — 99285 EMERGENCY DEPT VISIT HI MDM: CPT | Performed by: EMERGENCY MEDICINE

## 2024-09-05 PROCEDURE — 80053 COMPREHEN METABOLIC PANEL: CPT | Performed by: EMERGENCY MEDICINE

## 2024-09-05 PROCEDURE — 99223 1ST HOSP IP/OBS HIGH 75: CPT | Performed by: INTERNAL MEDICINE

## 2024-09-05 PROCEDURE — 70551 MRI BRAIN STEM W/O DYE: CPT

## 2024-09-05 PROCEDURE — 85025 COMPLETE CBC W/AUTO DIFF WBC: CPT | Performed by: EMERGENCY MEDICINE

## 2024-09-05 PROCEDURE — 70498 CT ANGIOGRAPHY NECK: CPT

## 2024-09-05 PROCEDURE — 93010 ELECTROCARDIOGRAM REPORT: CPT | Performed by: INTERNAL MEDICINE

## 2024-09-05 PROCEDURE — 85049 AUTOMATED PLATELET COUNT: CPT | Performed by: INTERNAL MEDICINE

## 2024-09-05 RX ORDER — ASPIRIN 81 MG/1
243 TABLET, CHEWABLE ORAL ONCE
Status: COMPLETED | OUTPATIENT
Start: 2024-09-05 | End: 2024-09-05

## 2024-09-05 RX ORDER — ASPIRIN 81 MG/1
81 TABLET, CHEWABLE ORAL DAILY
Status: DISCONTINUED | OUTPATIENT
Start: 2024-09-06 | End: 2024-09-06 | Stop reason: HOSPADM

## 2024-09-05 RX ORDER — ATORVASTATIN CALCIUM 40 MG/1
40 TABLET, FILM COATED ORAL EVERY EVENING
Status: DISCONTINUED | OUTPATIENT
Start: 2024-09-05 | End: 2024-09-06 | Stop reason: HOSPADM

## 2024-09-05 RX ORDER — ASPIRIN 81 MG/1
81 TABLET, CHEWABLE ORAL DAILY
Status: DISCONTINUED | OUTPATIENT
Start: 2024-09-05 | End: 2024-09-05 | Stop reason: ALTCHOICE

## 2024-09-05 RX ORDER — CLOPIDOGREL BISULFATE 75 MG/1
300 TABLET ORAL ONCE
Status: COMPLETED | OUTPATIENT
Start: 2024-09-05 | End: 2024-09-05

## 2024-09-05 RX ORDER — ENOXAPARIN SODIUM 100 MG/ML
40 INJECTION SUBCUTANEOUS DAILY
Status: DISCONTINUED | OUTPATIENT
Start: 2024-09-05 | End: 2024-09-06 | Stop reason: HOSPADM

## 2024-09-05 RX ORDER — SODIUM CHLORIDE 9 MG/ML
100 INJECTION, SOLUTION INTRAVENOUS CONTINUOUS
Status: DISCONTINUED | OUTPATIENT
Start: 2024-09-05 | End: 2024-09-06

## 2024-09-05 RX ORDER — CLOPIDOGREL BISULFATE 75 MG/1
75 TABLET ORAL DAILY
Status: DISCONTINUED | OUTPATIENT
Start: 2024-09-06 | End: 2024-09-06 | Stop reason: HOSPADM

## 2024-09-05 RX ADMIN — ENOXAPARIN SODIUM 40 MG: 40 INJECTION SUBCUTANEOUS at 14:42

## 2024-09-05 RX ADMIN — SODIUM CHLORIDE 100 ML/HR: 0.9 INJECTION, SOLUTION INTRAVENOUS at 14:41

## 2024-09-05 RX ADMIN — ATORVASTATIN CALCIUM 40 MG: 40 TABLET, FILM COATED ORAL at 17:18

## 2024-09-05 RX ADMIN — SODIUM CHLORIDE 500 ML: 0.9 INJECTION, SOLUTION INTRAVENOUS at 13:42

## 2024-09-05 RX ADMIN — IOHEXOL 85 ML: 350 INJECTION, SOLUTION INTRAVENOUS at 12:28

## 2024-09-05 RX ADMIN — CLOPIDOGREL BISULFATE 300 MG: 75 TABLET ORAL at 16:21

## 2024-09-05 RX ADMIN — ASPIRIN 81 MG 243 MG: 81 TABLET ORAL at 16:22

## 2024-09-05 NOTE — CONSULTS
NEUROLOGY RESIDENCY CONSULT NOTE     Name: Yehuda Trejo   Age & Sex: 64 y.o. male   MRN: [id number]  Unit/Bed#: ED-18   Encounter: [id number]  Length of Stay: 0    Recommendations for outpatient neurological follow up have yet to be determined.      Pending for discharge: Pending clinical progress    ASSESSMENT & PLAN     * Stroke-like symptoms  Assessment & Plan  64-year-old right-handed male presents after episode of  expressive aphasia on 09/04 around 1100 that lasted 2 minutes. Did also had an episode of visual disturbance on 09/4 around 7 am lasting aprox 20s. No other deficits suchs as blurry vision, paresthesias, weakness. No acute illness, no HA, palpitation, HA. Does have hx of similar event about 8 months ago.     Presenting deficits were: Episode of speaking difficulties for 4 mins  Interventions: Patient not a candidate. Unclear time of onset outside appropriate time window.     Workup:  Lab Results   Component Value Date    CHOLESTEROL 150 10/13/2021    LDLCALC 82 10/13/2021    TRIG 61 10/13/2021      CTH: No acute intracranial abnormality.   CTA: Unremarkable CTA neck and brain.   MRI: Pending  TTE/JOHN: Pending    Impression: Patient is a 64 year old male who presented to the the ED for stroke-like symptoms which resolved. At this time, etiology stroke versus TIA vs hypertensive encephalopathy given persistent hypertension and visual disturbances vs dehydration vs stress although less like. Doubt Focal seizure given no confusion, incontinence. Pending further work-up.    Plan:  Case discussed with Dr. Plummer  Continue AP/AC: Loaded with  mg x1 as patient took baby asa in the morning and Plavix 300 mg x1, then start 09/06 maintenance with ASA 81 mg daily and Plavix 75 mg daily  Continue cholesterol med: Lipitor 40 mg daily  MRI Brain wo contrast pending  Echocardiogram pending  Telemetry  Stroke labs: A1c, Lipid Panel  Healthy diet encouraged  PT/OT versus active exercise discussed  BP  "management and HTN med compliance discussed, continue permissive HTN, SBP <220   Rest of care as per primary care team         SUBJECTIVE     Reason for Consult / Principal Problem: Stroke like symptoms  Hx and PE limited by: None    HPI: Yehuda Trejo is a 64 y.o. right handed male who presents with difficulty expressing himself and visual disturbances. 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  States that these events lasted about 2 to 4 minutes resolving on his own.  On the same day around 700 did experience some visual disturbances described it as \" black and white barcodes and a wavy pattern\" lasting approximately 20 seconds.  Patient endorsed that they had similar events about a month ago with changes in his speech this only lasted a few seconds.  Also endorsed to has a strong family history of stroke in his father. Does not follow for preventive measures with PCP however previous blood work has been noted unremarkable.  Patient denies any history of hypertension although does not measure blood pressure consistently when he does a few months ago was running around 130s/80s.    During event denied any weakness, numbness tingling sensation.  No lightheadedness, dizziness, chest pain show, palpitations. No changes in mentation as he continued his normal day at work.  Does have remote smoking history, endorse daily 1-2 hard liquor containing drinks or one 7oz beer.     Inpatient consult to Neurology  Consult performed by: Geneva Howard MD  Consult ordered by: Kareen Mancuso MD  Reason for consult: Expressive aphasia          Historical Information   Past Medical History:   Diagnosis Date    Blood type A+     Other male erectile dysfunction 11/27/2018    Right ankle injury 11/04/2022     Past Surgical History:   Procedure " Laterality Date    HERNIA REPAIR Left     twice repaired in past    TONSILLECTOMY       Social History   Social History     Substance and Sexual Activity   Alcohol Use Yes    Comment: occasional     Social History     Substance and Sexual Activity   Drug Use No     E-Cigarette/Vaping    E-Cigarette Use Never User      E-Cigarette/Vaping Substances    Nicotine No     THC No     CBD No     Flavoring No     Other No     Unknown No      Social History     Tobacco Use   Smoking Status Never   Smokeless Tobacco Never     Family History: non-contributory  Meds/Allergies   all current active meds have been reviewed  No Known Allergies    Review of previous medical records was reviewed completed.       Review of Systems   Constitutional:  Negative for chills and fever.   HENT:  Negative for ear pain and sore throat.    Eyes:  Negative for pain and visual disturbance.   Respiratory:  Negative for cough and shortness of breath.    Cardiovascular:  Negative for chest pain and palpitations.   Gastrointestinal:  Negative for abdominal pain and vomiting.   Genitourinary:  Negative for dysuria and hematuria.   Musculoskeletal:  Negative for arthralgias and back pain.   Skin:  Negative for color change and rash.   Neurological:  Positive for speech difficulty. Negative for seizures and syncope.        Visual disturbances   All other systems reviewed and are negative.      OBJECTIVE     Patient ID: Yehuda Trejo is a 64 y.o. male.    Vitals:   Vitals:    24 1139 24 1200 24 1300 24 1430   BP: 163/77 162/72 158/75 (!) 181/79   BP Location:  Right arm Right arm Right arm   Pulse: 63 62 60 64   Resp:  18 18 18   Temp:       TempSrc:       SpO2: 95% 95% 95% 98%      There is no height or weight on file to calculate BMI.   No intake or output data in the 24 hours ending 24 1554    Temperature:   Temp (24hrs), Av.5 °F (36.4 °C), Min:97.5 °F (36.4 °C), Max:97.5 °F (36.4 °C)    Temperature: 97.5 °F (36.4  °C)    Invasive Devices:   Invasive Devices       Peripheral Intravenous Line  Duration             Peripheral IV 09/05/24 Left Antecubital <1 day                    Physical Exam  Vitals and nursing note reviewed.   Constitutional:       General: He is not in acute distress.     Appearance: He is well-developed.   HENT:      Head: Normocephalic and atraumatic.   Eyes:      Conjunctiva/sclera: Conjunctivae normal.   Cardiovascular:      Rate and Rhythm: Normal rate and regular rhythm.      Heart sounds: No murmur heard.  Pulmonary:      Effort: Pulmonary effort is normal. No respiratory distress.      Breath sounds: Normal breath sounds.   Abdominal:      Palpations: Abdomen is soft.      Tenderness: There is no abdominal tenderness.   Musculoskeletal:         General: No swelling.      Cervical back: Neck supple.      Right lower leg: No edema.      Left lower leg: No edema.   Skin:     General: Skin is warm and dry.      Capillary Refill: Capillary refill takes less than 2 seconds.   Neurological:      Mental Status: He is alert and oriented to person, place, and time.      Cranial Nerves: Cranial nerves 2-12 are intact.      Motor: Motor strength is normal.     Coordination: Finger-Nose-Finger Test and Heel to Shin Test normal.      Gait: Gait is intact.      Deep Tendon Reflexes:      Reflex Scores:       Tricep reflexes are 2+ on the right side and 2+ on the left side.       Bicep reflexes are 2+ on the right side and 2+ on the left side.       Brachioradialis reflexes are 2+ on the right side and 2+ on the left side.       Patellar reflexes are 2+ on the right side and 2+ on the left side.       Achilles reflexes are 2+ on the right side and 2+ on the left side.  Psychiatric:         Mood and Affect: Mood normal.         Speech: Speech normal.          Neurologic Exam     Mental Status   Oriented to person, place, and time.   Oriented to time.   Attention: normal. Concentration: normal.   Speech: speech is  normal   Level of consciousness: alert  Knowledge: good and consistent with education.     Cranial Nerves   Cranial nerves II through XII intact.     Motor Exam   Muscle bulk: normal  Overall muscle tone: normal    Strength   Strength 5/5 throughout.   Right neck flexion: 5/5  Left neck flexion: 5/5  Right neck extension: 5/5  Left neck extension: 5/5  Right deltoid: 5/5  Left deltoid: 5/5  Right biceps: 5/5  Left biceps: 5/5  Right triceps: 5/5  Left triceps: 5/5  Right wrist flexion: 5/5  Left wrist flexion: 5/5  Right wrist extension: 5/5  Left wrist extension: 5/5  Right interossei: 5/5  Left interossei: 5/5  Right abdominals: 5/5  Left abdominals: 5/5  Right iliopsoas: 5/5  Left iliopsoas: 5/5  Right quadriceps: 5/5  Left quadriceps: 5/5  Right hamstrin/5  Left hamstrin/5  Right glutei: 5/5  Left glutei: 5/5  Right anterior tibial: 5/5  Left anterior tibial: 5/5  Right posterior tibial: 5/5  Left posterior tibial: 5/5  Right peroneal: 5/5  Left peroneal: 5/5  Right gastroc: 5/5  Left gastroc: 5/5    Sensory Exam   Light touch normal.   Right arm light touch: normal  Left arm light touch: normal  Right leg light touch: normal  Left leg light touch: normal    Gait, Coordination, and Reflexes     Gait  Gait: normal    Coordination   Finger to nose coordination: normal  Heel to shin coordination: normal    Tremor   Resting tremor: absent    Reflexes   Right brachioradialis: 2+  Left brachioradialis: 2+  Right biceps: 2+  Left biceps: 2+  Right triceps: 2+  Left triceps: 2+  Right patellar: 2+  Left patellar: 2+  Right achilles: 2+  Left achilles: 2+  Right : 2+  Left : 2+         LABORATORY DATA     Labs: I have personally reviewed pertinent reports.    Results from last 7 days   Lab Units 24  1127   WBC Thousand/uL 9.48   HEMOGLOBIN g/dL 15.3   HEMATOCRIT % 46.1   PLATELETS Thousands/uL 303   SEGS PCT % 65   MONO PCT % 11   EOS PCT % 1      Results from last 7 days   Lab Units 24  1127    POTASSIUM mmol/L 4.8   CHLORIDE mmol/L 99   CO2 mmol/L 29   BUN mg/dL 11   CREATININE mg/dL 0.89   CALCIUM mg/dL 9.1   ALK PHOS U/L 34   ALT U/L 19   AST U/L 23                            IMAGING & DIAGNOSTIC TESTING     Radiology Results: I have personally reviewed pertinent reports.    XR follow up   Final Result by Laurent Watts DO (09/05 1531)      No radiopaque orbital foreign body.         Workstation performed: [id number]         CTA head and neck with and without contrast   Final Result by Luis Villareal MD (09/05 1251)      CT Brain:  No acute intracranial abnormality.      CT Angiography:  Unremarkable CTA neck and brain.                  Workstation performed: [id number]         MRI Inpatient Order    (Results Pending)       Other Diagnostic Testing: I have personally reviewed pertinent reports.      ACTIVE MEDICATIONS     Current Facility-Administered Medications   Medication Dose Route Frequency    aspirin chewable tablet 243 mg  243 mg Oral Once    [START ON 9/6/2024] aspirin chewable tablet 81 mg  81 mg Oral Daily    atorvastatin (LIPITOR) tablet 40 mg  40 mg Oral QPM    clopidogrel (PLAVIX) tablet 300 mg  300 mg Oral Once    [START ON 9/6/2024] clopidogrel (PLAVIX) tablet 75 mg  75 mg Oral Daily    enoxaparin (LOVENOX) subcutaneous injection 40 mg  40 mg Subcutaneous Daily    sodium chloride 0.9 % infusion  100 mL/hr Intravenous Continuous       Prior to Admission medications    Medication Sig Start Date End Date Taking? Authorizing Provider   predniSONE 10 mg tablet Take 4 tablets daily for 4 days then 3 tablets daily for 3 days then 2 tablets daily for 2 days then 1 tablet daily for 1 day then stop.  Patient not taking: Reported on 7/19/2024 8/4/22   BEATA Palacio   sildenafil (VIAGRA) 50 MG tablet Take 1 tablet (50 mg total) by mouth daily as needed for erectile dysfunction 10/5/21   BEATA Palacio       CODE STATUS & ADVANCED DIRECTIVES     Code Status: Level 1 -  Full Code  Advance Directive and Living Will:      Power of :    POLST:        VTE Pharmacologic Prophylaxis: Enoxaparin (Lovenox)  VTE Mechanical Prophylaxis: sequential compression device    ======    I have discussed the patient's history, physical exam findings, assessment, and plan in detail with attending, Dr. Plummer    Thank you for allowing me to participate in the care of your patient, Yehuda Trejo.    Geneva Howard MD  [facility] Internal Medicine Residency, PGY-3

## 2024-09-05 NOTE — CASE MANAGEMENT
Case Management Assessment    Patient name Yehuda Trejo  Location ED-18/ED-18 MRN [id number]  : 1960 Date 2024       Current Admission Date: 2024  Current Admission Diagnosis:Stroke-like symptoms   Patient Active Problem List    Diagnosis Date Noted Date Diagnosed    Stroke-like symptoms 2024     Hyponatremia 2024     Primary osteoarthritis of one hip, right 2022     Class 1 obesity due to excess calories with body mass index (BMI) of 30.0 to 30.9 in adult 2019     Other male erectile dysfunction 2018       LOS (days): 0  Geometric Mean LOS (GMLOS) (days):   Days to GMLOS:     OBJECTIVE:              Current admission status: Observation       Preferred Pharmacy:   [id number] - [place] - [facility] [address] [address]  Phone: [phone] Fax: [phone]    Primary Care Provider: BEATA Palacio    Primary Insurance: BLUE CROSS  Secondary Insurance: [employer]    ASSESSMENT:  Active Health Care Proxies    There are no active Health Care Proxies on file.                 Readmission Root Cause  30 Day Readmission: No    Patient Information  Admitted from:: Home  Mental Status: Alert  During Assessment patient was accompanied by: Spouse (Wife - Maria)  Assessment information provided by:: Patient  Primary Caregiver: Self  Support Systems: Self, Spouse/significant other, Children  What [place] do you live in[place]  Home entry access options. Select all that apply.: Stairs  Number of steps to enter home.: 5 (patient stated that he mainly uses the back entrance which has no stairs)  Do the steps have railings?: Yes  Type of Current Residence: 2 story home  Upon entering residence, is there a bedroom on the main floor (no further steps)?: No  A bedroom is located on the following floor levels of residence (select all that apply):: 2nd Floor  Upon entering residence, is there a bathroom on the main floor (no  further steps)?: No  Indicate which floors of current residence have a bathroom (select all the apply):: 2nd Floor  Number of steps to 2nd floor from main floor: One Flight (12-13 steps)  Living Arrangements: Lives w/ Spouse/significant other, Lives w/ Extended Family  Is patient a ?: No    Activities of Daily Living Prior to Admission  Functional Status: Independent  Completes ADLs independently?: Yes  Ambulates independently?: Yes  Does patient use assisted devices?: No  Does patient currently own DME?: No  Does patient have a history of Outpatient Therapy (PT/OT)?: Yes (Patient stated that he receives shots in his knee and hip)  Does the patient have a history of Short-Term Rehab?: No  Does patient have a history of HHC?: No  Does patient currently have HHC?: No         Patient Information Continued  Income Source: Self-employed  Does patient have prescription coverage?: Yes  Does patient receive dialysis treatments?: No  Does patient have a history of substance abuse?: No  Does patient have a history of Mental Health Diagnosis?: No         Means of Transportation  Means of Transport to Appts:: Drives Self      Social Determinants of Health (SDOH)      Flowsheet Row Most Recent Value   Housing Stability    In the last 12 months, was there a time when you were not able to pay the mortgage or rent on time? N   In the past 12 months, how many times have you moved where you were living? 0   At any time in the past 12 months, were you homeless or living in a shelter (including now)? N   Transportation Needs    In the past 12 months, has lack of transportation kept you from medical appointments or from getting medications? no   In the past 12 months, has lack of transportation kept you from meetings, work, or from getting things needed for daily living? No   Food Insecurity    Within the past 12 months, you worried that your food would run out before you got the money to buy more. Never true   Within the past 12  months, the food you bought just didn't last and you didn't have money to get more. Never true   Utilities    In the past 12 months has the electric, gas, oil, or water company threatened to shut off services in your home? No

## 2024-09-05 NOTE — ASSESSMENT & PLAN NOTE
64-year-old right-handed male presents after episode of  expressive aphasia on 09/04 around 1100 that lasted 2 minutes. Did also had an episode of visual disturbance on 09/4 around 7 am lasting aprox 20s. No other deficits suchs as blurry vision, paresthesias, weakness. No acute illness, no HA, palpitation, HA. Does have hx of similar event about 8 months ago.     Presenting deficits were: Episode of speaking difficulties for 4 mins  Interventions: Patient not a candidate. Unclear time of onset outside appropriate time window.     Workup:  Lab Results   Component Value Date    HGBA1C 5.3 09/06/2024    CHOLESTEROL 161 09/06/2024    LDLCALC 96 09/06/2024    TRIG 73 09/06/2024      CTH: No acute intracranial abnormality.   CTA: Unremarkable CTA neck and brain.   MRI: Mild microangiopathic changes. No acute infarction, edema or mass effect.  TTE/JOHN: Pending    Impression: Patient is a 64 year old male who presented to the the ED for stroke-like symptoms which resolved. At this time, suspect etiology  TIA and a component of hypertensive encephalopathy given persistent hypertension upon presentation and visual disturbances as well as patient does have history of a similar episode. In regards with visual disturbances and stereotypical sx can also consider focal sx/migraine    Plan:  Case discussed with Dr. Mahoney  Continue DAPT for total 21 days until 09/25 then recommend continue with ASA monotherapy in the setting TIA  Fortunately biochemical lipid panel overall unremarkable however LDL above 70 mg/dL recommend continue with Jaret intensity statin repeat Lipid Panel in about 6 months consider de escalate afterwards if unable tolerate or LDL goes below 50 mg/dL.  Will also recommend EEG on the outpatient setting due to visual disturbances and stereotypical sx ruled Focal events.    Echocardiogram pending  Telemetry no arrythmias  Stroke labs: completed  Healthy diet encouraged  PT/OT versus active exercise discussed  BP  management and HTN med compliance discussed recommend normotension will defer to primary team.    Will need follow-up in 6-8 weeks with neurovascular team for TIA .They will require a routine EEG before the appointment.  Neurology will sign off.   Rest of care as per primary care team

## 2024-09-05 NOTE — TELEPHONE ENCOUNTER
Caller: Yehuda    Doctor: Je    Reason for call: Patient is waiting a call back from his physician to see if she needs to go to the hospital for testing or not he may have had a stroke. (This is being taken care of by his PCP)  He said if he ends up in the hospital but is out by Friday can he still have his injection with you?    Call back#: [phone]

## 2024-09-05 NOTE — TELEPHONE ENCOUNTER
Called and spoke to pt. He states he couldn't get the correct words out when he was speaking to a customer yesterday and then it went away quickly. Several people encouraged him to go to the hospital but he went home and did some research. He is waiting for the PCP to call to see what they advise. Encouraged pt several times to go to the ER to be evaluated, he said he will go eventually just wanted to talk to the PCP first. If he is in the hospital he will call to cancel the appt.

## 2024-09-05 NOTE — ASSESSMENT & PLAN NOTE
Patient had an episode of expressive aphasia yesterday at 11 AM, that lasted 2 to 4 minutes.  This had resolved.  An hour prior to his aphasia, he had some vision disturbance.  Has strong family history of strokes.  Likely TIA.  Stroke workup/pathway.  Neurochecks.  Aspirin and statin medications.  These were discussed with the patient including benefits and side effects.  Permissive hypertension for the first 24 to 48 hours.  MRI of the brain.  Echocardiogram.  Lipid profile and A1c levels.  Telemetry.  Neurology consult.  Case discussed with neurology resident.

## 2024-09-05 NOTE — ED PROVIDER NOTES
History  Chief Complaint   Patient presents with    Difficulty Speaking     Pt presents after an episode of difficulty speaking yesterday for approx 4 minutes. Slight left sided mouth droop that pt states is normal after an injury as a child. Pt is otherwise neurologically intact in triage.      HPI    65yoM, pmhx RA, fhx stroke brother/father, presenting to ER 24hrs s/p period of expressive aphasia lasting approx 4-5 minutes and resolving. No other deficits.     Prior to Admission Medications   Prescriptions Last Dose Informant Patient Reported? Taking?   predniSONE 10 mg tablet  Self No No   Sig: Take 4 tablets daily for 4 days then 3 tablets daily for 3 days then 2 tablets daily for 2 days then 1 tablet daily for 1 day then stop.   Patient not taking: Reported on 7/19/2024   sildenafil (VIAGRA) 50 MG tablet  Self No No   Sig: Take 1 tablet (50 mg total) by mouth daily as needed for erectile dysfunction      Facility-Administered Medications: None       Past Medical History:   Diagnosis Date    Blood type A+     Other male erectile dysfunction 11/27/2018    Right ankle injury 11/04/2022       Past Surgical History:   Procedure Laterality Date    HERNIA REPAIR Left     twice repaired in past    TONSILLECTOMY         Family History   Problem Relation Age of Onset    Stroke Father 78    Diabetes Father 68    Alzheimer's disease Father     Heart attack Father         dx'd in 70s    Alcohol abuse Neg Hx     Substance Abuse Neg Hx     Mental illness Neg Hx     Depression Neg Hx      I have reviewed and agree with the history as documented.    E-Cigarette/Vaping    E-Cigarette Use Never User      E-Cigarette/Vaping Substances    Nicotine No     THC No     CBD No     Flavoring No     Other No     Unknown No      Social History     Tobacco Use    Smoking status: Never    Smokeless tobacco: Never   Vaping Use    Vaping status: Never Used   Substance Use Topics    Alcohol use: Yes     Comment: occasional    Drug use: No        Review of Systems   Constitutional:  Negative for chills and fever.   HENT:  Negative for ear pain and sore throat.    Eyes:  Negative for pain and visual disturbance.   Respiratory:  Negative for cough and shortness of breath.    Cardiovascular:  Negative for chest pain and palpitations.   Gastrointestinal:  Negative for abdominal pain and vomiting.   Genitourinary:  Negative for dysuria and hematuria.   Musculoskeletal:  Negative for arthralgias and back pain.   Skin:  Negative for color change and rash.   Neurological:  Positive for speech difficulty. Negative for seizures and syncope.   All other systems reviewed and are negative.      Physical Exam  Physical Exam  Vitals and nursing note reviewed.   Constitutional:       General: He is not in acute distress.     Appearance: He is well-developed.   HENT:      Head: Normocephalic and atraumatic.   Eyes:      Conjunctiva/sclera: Conjunctivae normal.   Cardiovascular:      Rate and Rhythm: Normal rate and regular rhythm.      Heart sounds: No murmur heard.  Pulmonary:      Effort: Pulmonary effort is normal. No respiratory distress.      Breath sounds: Normal breath sounds.   Abdominal:      Palpations: Abdomen is soft.      Tenderness: There is no abdominal tenderness.   Musculoskeletal:         General: No swelling.      Cervical back: Neck supple.   Skin:     General: Skin is warm and dry.      Capillary Refill: Capillary refill takes less than 2 seconds.   Neurological:      Mental Status: He is alert.      Comments: AAOX4. CN intact. Gross vision intact all 4 quadrants. Cerebellar signs with finger to nose and heel to shin intact. All extremities 5/5 strength. Gross sensation appreciated face and extremities.      Psychiatric:         Mood and Affect: Mood normal.         Vital Signs  ED Triage Vitals [09/05/24 1059]   Temperature Pulse Respirations Blood Pressure SpO2   97.5 °F (36.4 °C) 77 18 (!) 201/88 99 %      Temp Source Heart Rate Source Patient  Position - Orthostatic VS BP Location FiO2 (%)   Oral Monitor Sitting Right arm --      Pain Score       --           Vitals:    09/05/24 1059 09/05/24 1139 09/05/24 1200 09/05/24 1300   BP: (!) 201/88 163/77 162/72 158/75   Pulse: 77 63 62 60   Patient Position - Orthostatic VS: Sitting  Sitting Sitting         Visual Acuity  Visual Acuity      Flowsheet Row Most Recent Value   L Pupil Size (mm) 3   R Pupil Size (mm) 3            ED Medications  Medications   sodium chloride 0.9 % bolus 500 mL (500 mL Intravenous New Bag 9/5/24 1342)   sodium chloride 0.9 % infusion (has no administration in time range)   atorvastatin (LIPITOR) tablet 40 mg (has no administration in time range)   aspirin chewable tablet 81 mg (81 mg Oral Not Given 9/5/24 1438)   enoxaparin (LOVENOX) subcutaneous injection 40 mg (has no administration in time range)   iohexol (OMNIPAQUE) 350 MG/ML injection (MULTI-DOSE) 85 mL (85 mL Intravenous Given 9/5/24 1228)       Diagnostic Studies  Results Reviewed       Procedure Component Value Units Date/Time    Platelet count [[id number]]     Lab Status: No result Specimen: Blood     Comprehensive metabolic panel [[id number]]  (Abnormal) Collected: 09/05/24 1127    Lab Status: Final result Specimen: Blood from Arm, Left Updated: 09/05/24 1208     Sodium 133 mmol/L      Potassium 4.8 mmol/L      Chloride 99 mmol/L      CO2 29 mmol/L      ANION GAP 5 mmol/L      BUN 11 mg/dL      Creatinine 0.89 mg/dL      Glucose 111 mg/dL      Calcium 9.1 mg/dL      AST 23 U/L      ALT 19 U/L      Alkaline Phosphatase 34 U/L      Total Protein 7.9 g/dL      Albumin 4.4 g/dL      Total Bilirubin 0.46 mg/dL      eGFR 90 ml/min/1.73sq m     Narrative:      National Kidney Disease Foundation guidelines for Chronic Kidney Disease (CKD):     Stage 1 with normal or high GFR (GFR > 90 mL/min/1.73 square meters)    Stage 2 Mild CKD (GFR = 60-89 mL/min/1.73 square meters)    Stage 3A Moderate CKD (GFR = 45-59 mL/min/1.73 square  meters)    Stage 3B Moderate CKD (GFR = 30-44 mL/min/1.73 square meters)    Stage 4 Severe CKD (GFR = 15-29 mL/min/1.73 square meters)    Stage 5 End Stage CKD (GFR <15 mL/min/1.73 square meters)  Note: GFR calculation is accurate only with a steady state creatinine    CBC and differential [[id number]] Collected: 09/05/24 1127    Lab Status: Final result Specimen: Blood from Arm, Left Updated: 09/05/24 1145     WBC 9.48 Thousand/uL      RBC 5.07 Million/uL      Hemoglobin 15.3 g/dL      Hematocrit 46.1 %      MCV 91 fL      MCH 30.2 pg      MCHC 33.2 g/dL      RDW 13.7 %      MPV 9.3 fL      Platelets 303 Thousands/uL      nRBC 0 /100 WBCs      Segmented % 65 %      Immature Grans % 1 %      Lymphocytes % 21 %      Monocytes % 11 %      Eosinophils Relative 1 %      Basophils Relative 1 %      Absolute Neutrophils 6.24 Thousands/µL      Absolute Immature Grans 0.10 Thousand/uL      Absolute Lymphocytes 1.98 Thousands/µL      Absolute Monocytes 1.02 Thousand/µL      Eosinophils Absolute 0.07 Thousand/µL      Basophils Absolute 0.07 Thousands/µL                    CTA head and neck with and without contrast   Final Result by Luis Villareal MD (09/05 1251)      CT Brain:  No acute intracranial abnormality.      CT Angiography:  Unremarkable CTA neck and brain.                  Workstation performed: [id number]         MRI Inpatient Order    (Results Pending)              Procedures  ECG 12 Lead Documentation Only    Date/Time: 9/5/2024 2:39 PM    Performed by: Nawaf Jay DO  Authorized by: Nawaf Jay DO    Indications / Diagnosis:  Tia  ECG reviewed by me, the ED Provider: yes    Patient location:  ED  Previous ECG:     Previous ECG:  Unavailable  Comments:      Nsr with nonspecific changes           ED Course                                               Medical Decision Making  65yoM, pmhx RA, fhx stroke brother/father, presenting to ER 24hrs s/p period of expressive aphasia lasting approx 4-5 minutes and  resolving. No other deficits. HTN, VS otherwise stable. Exam here benign. CBC, CMP mild hypoNa. Fluids provided. EKG NSR. CTA without patho. Request obs TIA eval. Hospitalist accepts.     Amount and/or Complexity of Data Reviewed  External Data Reviewed: notes.  Labs: ordered.  Radiology: ordered.  ECG/medicine tests: ordered and independent interpretation performed.    Risk  Prescription drug management.  Decision regarding hospitalization.                 Disposition  Final diagnoses:   TIA (transient ischemic attack)   Aphasia     Time reflects when diagnosis was documented in both MDM as applicable and the Disposition within this note       Time User Action Codes Description Comment    9/5/2024  1:03 PM Nawaf Jay Add [G45.9] TIA (transient ischemic attack)     9/5/2024  1:03 PM Nawaf Jay Add [R47.01] Aphasia     9/5/2024  2:18 PM Kareen Mancuso Add [R29.90] Stroke-like symptoms           ED Disposition       ED Disposition   Admit    Condition   Stable    Date/Time   Thu Sep 5, 2024  1:03 PM    Comment   Laura,Todhe               Follow-up Information    None         Patient's Medications   Discharge Prescriptions    No medications on file       No discharge procedures on file.    PDMP Review         Value Time User    PDMP Reviewed  Yes 9/5/2024  2:16 PM Kareen Mancuso MD            ED Provider  Electronically Signed by             Nawaf Jay DO  09/05/24 7141

## 2024-09-05 NOTE — TELEPHONE ENCOUNTER
Caller: Patient    Doctor: Je    Reason for call: Called to reschedule usgi for 9/6. Is currently @[place] ED for possible TIA. Was advised would be staying overnight. Please cb to reschedule usgi    Call back#: [phone]

## 2024-09-05 NOTE — ASSESSMENT & PLAN NOTE
Mild and asymptomatic at this point.  Continue IV fluid with normal saline x 1 L.  Monitor.  For further evaluation and management if no improvement.

## 2024-09-05 NOTE — H&P
[facility]  H&P  Name: Yehuda Trejo 64 y.o. male I MRN: [id number]  Unit/Bed#: ED-18 I Date of Admission: 9/5/2024   Date of Service: 9/5/2024 I Hospital Day: 0      Assessment & Plan   * Stroke-like symptoms  Assessment & Plan  Patient had an episode of expressive aphasia yesterday at 11 AM, that lasted 2 to 4 minutes.  This had resolved.  An hour prior to his aphasia, he had some vision disturbance.  Has strong family history of strokes.  Likely TIA.  Stroke workup/pathway.  Neurochecks.  Aspirin and statin medications.  These were discussed with the patient including benefits and side effects.  Permissive hypertension for the first 24 to 48 hours.  MRI of the brain.  Echocardiogram.  Lipid profile and A1c levels.  Telemetry.  Neurology consult.  Case discussed with neurology resident.    Hyponatremia  Assessment & Plan  Mild and asymptomatic at this point.  Continue IV fluid with normal saline x 1 L.  Monitor.  For further evaluation and management if no improvement.           VTE Pharmacologic Prophylaxis: VTE Score: 3 Moderate Risk (Score 3-4) - Pharmacological DVT Prophylaxis Ordered: enoxaparin (Lovenox).  Code Status: Level 1 - Full Code   Discussion with family: Updated  (wife) at bedside.    Anticipated Length of Stay: Patient will be admitted on an observation basis with an anticipated length of stay of less than 2 midnights secondary to above findings and plans.    Total Time Spent on Date of Encounter in care of patient: This time was spent on one or more of the following: performing physical exam; counseling and coordination of care; obtaining or reviewing history; documenting in the medical record; reviewing/ordering tests, medications or procedures; communicating with other healthcare professionals and discussing with patient's family/caregivers.    Chief Complaint: Strokelike symptoms with difficulty expressing himself and visual disturbance    History of  Present Illness:  Yehuda Trejo is a 64 y.o. male with a PMH significant for erectile l dysfunction who presents with strokelike symptoms with difficulty expressing himself and visual disturbance.  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  According to him, he cannot even put out a coherent sentence and had mild slurring of speech, too, at the time.  According to him, this lasted about 2 to 4 minutes and resolved on its own.  An hour prior to this episode of aphasia, he experienced some visual disturbance for which the patient described seeing black and white barcodes and this also lasted briefly.  Patient told me that he has significant family history of strokes, particularly with his dad, thus he googled his symptoms and found out that he may have a TIA.  He told me, he did not go to the hospital/ER at once as he does not want his wife to panic, as her blood pressures usually go up really high if she panics.  This morning, he spoke to his physician assistant as well as his primary care physician and he was advised to go to the emergency room for further evaluation and management.  At present, patient denied any symptoms.  He denied any weakness or any numbness.  He denied any more problems speaking or any slurring of speech.  He denied any more vision problems.  He denied any dizziness or vertigo or any problems swallowing.  Patient told me that he started taking aspirin yesterday.  Patient also told me, that he took Viagra overnight.      Review of Systems:  Review of Systems    10 point review systems done and they were negative except for the ones I mentioned in my history of present illness.  Patient denied any headaches, or any loss of consciousness.  Patient denied any chest pains or shortness of breath.  Patient denied any cough or colds.  Patient denied any nausea, vomiting or any abdominal pains.  Patient  denied any urinary or bowel movement problems.  For the other symptoms, please see notes above.      Past Medical and Surgical History:   Past Medical History:   Diagnosis Date    Blood type A+     Other male erectile dysfunction 11/27/2018    Right ankle injury 11/04/2022       Past Surgical History:   Procedure Laterality Date    HERNIA REPAIR Left     twice repaired in past    TONSILLECTOMY         Meds/Allergies:  Prior to Admission medications    Medication Sig Start Date End Date Taking? Authorizing Provider   predniSONE 10 mg tablet Take 4 tablets daily for 4 days then 3 tablets daily for 3 days then 2 tablets daily for 2 days then 1 tablet daily for 1 day then stop.  Patient not taking: Reported on 7/19/2024 8/4/22   BEATA Palacio   sildenafil (VIAGRA) 50 MG tablet Take 1 tablet (50 mg total) by mouth daily as needed for erectile dysfunction 10/5/21   BEATA Palacio     I have reviewed home medications using recent Epic encounter.    Allergies: No Known Allergies    Social History:  Marital Status: /Civil Union   Patient Pre-hospital Living Situation: Home with wife.  Patient Pre-hospital Level of Mobility: walks  Substance Use History:   Social History     Substance and Sexual Activity   Alcohol Use Yes    Comment: occasional     Social History     Tobacco Use   Smoking Status Never   Smokeless Tobacco Never     Social History     Substance and Sexual Activity   Drug Use No       Family History:  Family History   Problem Relation Age of Onset    Stroke Father 78    Diabetes Father 68    Alzheimer's disease Father     Heart attack Father         dx'd in 70s    Alcohol abuse Neg Hx     Substance Abuse Neg Hx     Mental illness Neg Hx     Depression Neg Hx        Physical Exam:     Vitals:   Blood Pressure: 158/75 (09/05/24 1300)  Pulse: 60 (09/05/24 1300)  Temperature: 97.5 °F (36.4 °C) (09/05/24 1059)  Temp Source: Oral (09/05/24 1059)  Respirations: 18 (09/05/24 1300)  SpO2: 95 %  (09/05/24 1300)    Physical Exam  Vitals and nursing note reviewed. Exam conducted with a chaperone present.   Constitutional:       General: He is not in acute distress.     Appearance: Normal appearance. He is not ill-appearing, toxic-appearing or diaphoretic.   HENT:      Head: Normocephalic and atraumatic.      Mouth/Throat:      Mouth: Mucous membranes are dry.      Pharynx: Oropharynx is clear. No oropharyngeal exudate or posterior oropharyngeal erythema.   Eyes:      General: No scleral icterus.        Right eye: No discharge.         Left eye: No discharge.      Extraocular Movements: Extraocular movements intact.      Conjunctiva/sclera: Conjunctivae normal.      Pupils: Pupils are equal, round, and reactive to light.   Neck:      Vascular: No carotid bruit.   Cardiovascular:      Rate and Rhythm: Normal rate and regular rhythm.      Heart sounds: Normal heart sounds.   Pulmonary:      Effort: Pulmonary effort is normal. No respiratory distress.      Breath sounds: Normal breath sounds.   Abdominal:      General: Bowel sounds are normal. There is no distension.      Palpations: Abdomen is soft.      Tenderness: There is no abdominal tenderness.   Musculoskeletal:      Right lower leg: No edema.      Left lower leg: No edema.   Skin:     General: Skin is warm and dry.      Coloration: Skin is not pale.      Findings: No erythema or rash.   Neurological:      General: No focal deficit present.      Mental Status: He is alert and oriented to person, place, and time. Mental status is at baseline.      Cranial Nerves: No cranial nerve deficit.      Sensory: No sensory deficit.      Motor: No weakness.      Comments: Finger-to-nose test, heel-to-shin test, hand pronation supination test were all normal.   Psychiatric:         Mood and Affect: Mood normal.         Behavior: Behavior normal.         Thought Content: Thought content normal.              Additional Data:     Lab Results:  Results from last 7 days  "  Lab Units 09/05/24  1127   WBC Thousand/uL 9.48   HEMOGLOBIN g/dL 15.3   HEMATOCRIT % 46.1   PLATELETS Thousands/uL 303   SEGS PCT % 65   LYMPHO PCT % 21   MONO PCT % 11   EOS PCT % 1     Results from last 7 days   Lab Units 09/05/24  1127   SODIUM mmol/L 133*   POTASSIUM mmol/L 4.8   CHLORIDE mmol/L 99   CO2 mmol/L 29   BUN mg/dL 11   CREATININE mg/dL 0.89   ANION GAP mmol/L 5   CALCIUM mg/dL 9.1   ALBUMIN g/dL 4.4   TOTAL BILIRUBIN mg/dL 0.46   ALK PHOS U/L 34   ALT U/L 19   AST U/L 23   GLUCOSE RANDOM mg/dL 111             No results found for: \"HGBA1C\"        Lines/Drains:  Invasive Devices       Peripheral Intravenous Line  Duration             Peripheral IV 09/05/24 Left Antecubital <1 day                        Imaging: Reviewed radiology reports from this admission including: CT head and CT angiogram of the head and neck  CTA head and neck with and without contrast   Final Result by Luis Villareal MD (09/05 1251)      CT Brain:  No acute intracranial abnormality.      CT Angiography:  Unremarkable CTA neck and brain.                  Workstation performed: [id number]         MRI Inpatient Order    (Results Pending)       EKG and Other Studies Reviewed on Admission:   EKG: NSR. HR 71/min, no previous EKG for comparison.    ** Please Note: This note has been constructed using a voice recognition system. **    "

## 2024-09-05 NOTE — TELEPHONE ENCOUNTER
"Please advise  Attempted warm transfer to clinical without success .  Patient is deferring ED at this time. Rather see PCP in the office. States the episode was 2-34 minutes. Only symptoms was loss of speech, denies any one sided weakness, headache . He stated he will go if PCP feels it is best . Reason for Disposition   Neurologic deficit that was brief (now gone), ANY of the following: * Weakness of the face, arm, or leg on one side of the body* Numbness of the face, arm, or leg on one side of the body* Loss of speech or garbled speech    Answer Assessment - Initial Assessment Questions  1. SYMPTOM: \"What is the main symptom you are concerned about?\" (e.g., weakness, numbness)      Loss of speech  2. ONSET: \"When did this start?\" (minutes, hours, days; while sleeping)      Yesterday   3. LAST NORMAL: \"When was the last time you were normal (no symptoms)?\"      Yesterday   4. PATTERN \"Does this come and go, or has it been constant since it started?\"  \"Is it present now?\"      One episode , 2-4 minutes  5. CARDIAC SYMPTOMS: \"Have you had any of the following symptoms: chest pain, difficulty breathing, palpitations?\"      Denies   6. NEUROLOGIC SYMPTOMS: \"Have you had any of the following symptoms: headache, dizziness, vision loss, double vision, changes in speech, unsteady on your feet?\"      Change in speech , earlier , one hour prior had some visual disturbance   7. OTHER SYMPTOMS: \"Do you have any other symptoms?\"      denies  8. PREGNANCY: \"Is there any chance you are pregnant?\" \"When was your last menstrual period?\"      N/a    Protocols used: Neurologic Deficit-ADULT-[place]    "

## 2024-09-06 ENCOUNTER — TELEPHONE (OUTPATIENT)
Age: 64
End: 2024-09-06

## 2024-09-06 ENCOUNTER — APPOINTMENT (OUTPATIENT)
Dept: NON INVASIVE DIAGNOSTICS | Facility: HOSPITAL | Age: 64
End: 2024-09-06
Payer: COMMERCIAL

## 2024-09-06 VITALS
DIASTOLIC BLOOD PRESSURE: 73 MMHG | RESPIRATION RATE: 16 BRPM | WEIGHT: 235 LBS | OXYGEN SATURATION: 96 % | BODY MASS INDEX: 33.64 KG/M2 | HEART RATE: 63 BPM | TEMPERATURE: 97.5 F | SYSTOLIC BLOOD PRESSURE: 152 MMHG | HEIGHT: 70 IN

## 2024-09-06 PROBLEM — I10 HYPERTENSION: Status: ACTIVE | Noted: 2024-09-06

## 2024-09-06 LAB
ANION GAP SERPL CALCULATED.3IONS-SCNC: 6 MMOL/L (ref 4–13)
AORTIC ROOT: 3.4 CM
AORTIC VALVE MEAN VELOCITY: 9.5 M/S
APICAL FOUR CHAMBER EJECTION FRACTION: 62 %
ASCENDING AORTA: 3.6 CM
AV LVOT MEAN GRADIENT: 2 MMHG
AV LVOT PEAK GRADIENT: 4 MMHG
AV MEAN GRADIENT: 4 MMHG
AV PEAK GRADIENT: 9 MMHG
AV REGURGITATION PRESSURE HALF TIME: 930 MS
AV VELOCITY RATIO: 0.67
BSA FOR ECHO PROCEDURE: 2.24 M2
BUN SERPL-MCNC: 13 MG/DL (ref 5–25)
CALCIUM SERPL-MCNC: 8.8 MG/DL (ref 8.4–10.2)
CHLORIDE SERPL-SCNC: 104 MMOL/L (ref 96–108)
CHOLEST SERPL-MCNC: 161 MG/DL
CO2 SERPL-SCNC: 25 MMOL/L (ref 21–32)
CREAT SERPL-MCNC: 0.81 MG/DL (ref 0.6–1.3)
DOP CALC AO PEAK VEL: 1.53 M/S
DOP CALC AO VTI: 29.88 CM
DOP CALC LVOT PEAK VEL VTI: 22.61 CM
DOP CALC LVOT PEAK VEL: 1.02 M/S
E WAVE DECELERATION TIME: 193 MS
E/A RATIO: 0.81
EST. AVERAGE GLUCOSE BLD GHB EST-MCNC: 105 MG/DL
FRACTIONAL SHORTENING: 29 (ref 28–44)
GFR SERPL CREATININE-BSD FRML MDRD: 93 ML/MIN/1.73SQ M
GLUCOSE P FAST SERPL-MCNC: 93 MG/DL (ref 65–99)
GLUCOSE SERPL-MCNC: 93 MG/DL (ref 65–140)
HBA1C MFR BLD: 5.3 %
HDLC SERPL-MCNC: 50 MG/DL
INTERVENTRICULAR SEPTUM IN DIASTOLE (PARASTERNAL SHORT AXIS VIEW): 1 CM
INTERVENTRICULAR SEPTUM: 1 CM (ref 0.6–1.1)
LAAS-AP2: 20.1 CM2
LAAS-AP4: 19.5 CM2
LDLC SERPL CALC-MCNC: 96 MG/DL (ref 0–100)
LEFT ATRIUM SIZE: 4.7 CM
LEFT ATRIUM VOLUME (MOD BIPLANE): 55 ML
LEFT ATRIUM VOLUME INDEX (MOD BIPLANE): 24.6 ML/M2
LEFT INTERNAL DIMENSION IN SYSTOLE: 3.7 CM (ref 2.1–4)
LEFT VENTRICULAR INTERNAL DIMENSION IN DIASTOLE: 5.2 CM (ref 3.5–6)
LEFT VENTRICULAR POSTERIOR WALL IN END DIASTOLE: 1 CM
LEFT VENTRICULAR STROKE VOLUME: 75 ML
LVSV (TEICH): 75 ML
MV E'TISSUE VEL-LAT: 10 CM/S
MV E'TISSUE VEL-SEP: 8 CM/S
MV PEAK A VEL: 0.86 M/S
MV PEAK E VEL: 70 CM/S
MV STENOSIS PRESSURE HALF TIME: 56 MS
MV VALVE AREA P 1/2 METHOD: 3.93
POTASSIUM SERPL-SCNC: 4.3 MMOL/L (ref 3.5–5.3)
RIGHT ATRIUM AREA SYSTOLE A4C: 17.5 CM2
RIGHT VENTRICLE ID DIMENSION: 4 CM
SL CV AV DECELERATION TIME RETROGRADE: 3206 MS
SL CV AV PEAK GRADIENT RETROGRADE: 66 MMHG
SL CV LEFT ATRIUM LENGTH A2C: 5.5 CM
SL CV LV EF: 60
SL CV PED ECHO LEFT VENTRICLE DIASTOLIC VOLUME (MOD BIPLANE) 2D: 132 ML
SL CV PED ECHO LEFT VENTRICLE SYSTOLIC VOLUME (MOD BIPLANE) 2D: 57 ML
SODIUM SERPL-SCNC: 135 MMOL/L (ref 135–147)
TRICUSPID ANNULAR PLANE SYSTOLIC EXCURSION: 2.4 CM
TRIGL SERPL-MCNC: 73 MG/DL

## 2024-09-06 PROCEDURE — 93306 TTE W/DOPPLER COMPLETE: CPT

## 2024-09-06 PROCEDURE — 99239 HOSP IP/OBS DSCHRG MGMT >30: CPT | Performed by: INTERNAL MEDICINE

## 2024-09-06 PROCEDURE — 83036 HEMOGLOBIN GLYCOSYLATED A1C: CPT | Performed by: STUDENT IN AN ORGANIZED HEALTH CARE EDUCATION/TRAINING PROGRAM

## 2024-09-06 PROCEDURE — 80048 BASIC METABOLIC PNL TOTAL CA: CPT | Performed by: STUDENT IN AN ORGANIZED HEALTH CARE EDUCATION/TRAINING PROGRAM

## 2024-09-06 PROCEDURE — 97166 OT EVAL MOD COMPLEX 45 MIN: CPT

## 2024-09-06 PROCEDURE — 80061 LIPID PANEL: CPT | Performed by: STUDENT IN AN ORGANIZED HEALTH CARE EDUCATION/TRAINING PROGRAM

## 2024-09-06 PROCEDURE — 93306 TTE W/DOPPLER COMPLETE: CPT | Performed by: INTERNAL MEDICINE

## 2024-09-06 PROCEDURE — 99232 SBSQ HOSP IP/OBS MODERATE 35: CPT | Performed by: PSYCHIATRY & NEUROLOGY

## 2024-09-06 RX ORDER — ASPIRIN 81 MG/1
81 TABLET, CHEWABLE ORAL DAILY
Qty: 30 TABLET | Refills: 0 | Status: SHIPPED | OUTPATIENT
Start: 2024-09-07 | End: 2024-10-07

## 2024-09-06 RX ORDER — AMLODIPINE BESYLATE 5 MG/1
5 TABLET ORAL DAILY
Status: DISCONTINUED | OUTPATIENT
Start: 2024-09-06 | End: 2024-09-06 | Stop reason: HOSPADM

## 2024-09-06 RX ORDER — ATORVASTATIN CALCIUM 40 MG/1
40 TABLET, FILM COATED ORAL EVERY EVENING
Qty: 30 TABLET | Refills: 0 | Status: SHIPPED | OUTPATIENT
Start: 2024-09-06 | End: 2024-10-06

## 2024-09-06 RX ORDER — CLOPIDOGREL BISULFATE 75 MG/1
75 TABLET ORAL DAILY
Qty: 21 TABLET | Refills: 0 | Status: SHIPPED | OUTPATIENT
Start: 2024-09-07 | End: 2024-09-28

## 2024-09-06 RX ORDER — AMLODIPINE BESYLATE 5 MG/1
5 TABLET ORAL DAILY
Qty: 30 TABLET | Refills: 0 | Status: SHIPPED | OUTPATIENT
Start: 2024-09-07 | End: 2024-09-12 | Stop reason: SDUPTHER

## 2024-09-06 RX ADMIN — ASPIRIN 81 MG 81 MG: 81 TABLET ORAL at 08:25

## 2024-09-06 RX ADMIN — CLOPIDOGREL BISULFATE 75 MG: 75 TABLET ORAL at 08:25

## 2024-09-06 RX ADMIN — AMLODIPINE BESYLATE 5 MG: 5 TABLET ORAL at 12:42

## 2024-09-06 RX ADMIN — ENOXAPARIN SODIUM 40 MG: 40 INJECTION SUBCUTANEOUS at 08:26

## 2024-09-06 NOTE — ASSESSMENT & PLAN NOTE
Patient had an episode of expressive aphasia yesterday at 11 AM, that lasted 2 to 4 minutes.  This had resolved.  An hour prior to his aphasia, he had some vision disturbance.  Has strong family history of strokes.  Likely TIA.  Stroke workup/pathway.  Neurochecks.  Aspirin and statin medications.  These were discussed with the patient including benefits and side effects.  Permissive hypertension for the first 24 to 48 hours.  MRI of the brain did not show acute stroke  Echocardiogram did not show PFO  Lipid profile and A1c levels.  Telemetry.  Neurology consulted and recommended dual antiplatelet therapy with aspirin and Plavix for 21 days, followed by aspirin monotherapy.  Patient also needs outpatient EEG and neurology follow-up on discharge

## 2024-09-06 NOTE — ASSESSMENT & PLAN NOTE
Patient's blood pressure remained in the 130s to 150s.  He was started on Norvasc 5 mg daily  Instructed him to check his blood pressure daily  Follow-up with PCP

## 2024-09-06 NOTE — DISCHARGE SUMMARY
[facility]  Discharge- Yehuda Trejo 1960, 64 y.o. male MRN: [id number]  Unit/Bed#: S -[facility] Encounter: [id number]  Primary Care Provider: BEATA Palacio   Date and time admitted to hospital: 9/5/2024 11:01 AM    * Stroke-like symptoms  Assessment & Plan  Patient had an episode of expressive aphasia yesterday at 11 AM, that lasted 2 to 4 minutes.  This had resolved.  An hour prior to his aphasia, he had some vision disturbance.  Has strong family history of strokes.  Likely TIA.  Stroke workup/pathway.  Neurochecks.  Aspirin and statin medications.  These were discussed with the patient including benefits and side effects.  Permissive hypertension for the first 24 to 48 hours.  MRI of the brain did not show acute stroke  Echocardiogram did not show PFO  Lipid profile and A1c levels.  Telemetry.  Neurology consulted and recommended dual antiplatelet therapy with aspirin and Plavix for 21 days, followed by aspirin monotherapy.  Patient also needs outpatient EEG and neurology follow-up on discharge    Hypertension  Assessment & Plan  Patient's blood pressure remained in the 130s to 150s.  He was started on Norvasc 5 mg daily  Instructed him to check his blood pressure daily  Follow-up with PCP    Hyponatremia  Assessment & Plan  Mild and asymptomatic at this point.  Continue IV fluid with normal saline x 1 L.  Monitor.  For further evaluation and management if no improvement.        Medical Problems       Resolved Problems  Date Reviewed: 9/5/2024   None       Discharging Resident: Josemanuel Welch MD  Discharging Attending: Bi Mares MD  PCP: BEATA Palacio  Admission Date:   Admission Orders (From admission, onward)       Ordered        09/05/24 1309  Place in Observation  Once                          Discharge Date: 09/06/24    Consultations During Hospital Stay:  Neurology    Procedures Performed:   None    Significant Findings / Test Results:    MRI brain wo contrast   Final Result by Luis Manuel Romano MD (09/05 1720)      No acute infarction, edema, or mass effect.      Mild chronic microangiopathic ischemic changes.      Workstation performed: [id number]         XR follow up   Final Result by Laurent Watts DO (09/05 1531)      No radiopaque orbital foreign body.         Workstation performed: [id number]         CTA head and neck with and without contrast   Final Result by Luis Villareal MD (09/05 1251)      CT Brain:  No acute intracranial abnormality.      CT Angiography:  Unremarkable CTA neck and brain.                  Workstation performed: [id number]           Echocardiogram showing EF 60%, no WMA, normal systolic and diastolic function.  No PFO detected with bubble study.  Mild to moderate aortic valve regurgitation.    Incidental Findings:        Test Results Pending at Discharge (will require follow up):  None     Outpatient Tests Requested:  None    Complications: None    Reason for Admission: Strokelike symptoms    Hospital Course:   Yehuda Trejo is a 64 y.o. male patient with past medical history of obesity who originally presented to the hospital on 9/5/2024 due to strokelike symptoms, described as dysarthria and expressive aphasia starting on Wednesday, September 4 around 11 AM, that resolved after a few minutes.  Patient read up on his symptoms and saw that he was having a potential stroke.  He did not come in immediately because he did not want his wife to worry.  In the ED, patient had a CT head and CTA head and neck that were unremarkable.  A1c and lipid panel unremarkable.  Brain MRI did not show any acute stroke.  Echocardiogram did not show any PFO.  Neurology was consulted and they recommended dual antiplatelet therapy with aspirin and Plavix for 21 days, followed by aspirin monotherapy.  Patient needs an outpatient EEG and neurology follow-up on discharge.  He was instructed to follow-up with his PCP within 2 weeks of  "discharge also.  Patient's blood pressure remained between 130s and 150s systolic, he was started on Norvasc 5 mg daily.  He was instructed to check his blood pressure daily.    Please see above list of diagnoses and related plan for additional information.     Condition at Discharge: good    Discharge Day Visit / Exam:   Subjective: No acute complaints at this time.  Vitals: Blood Pressure: 152/73 (09/06/24 0916)  Pulse: 63 (09/06/24 0916)  Temperature: 97.5 °F (36.4 °C) (09/06/24 0725)  Temp Source: Oral (09/06/24 0725)  Respirations: 16 (09/06/24 0725)  Height: 5' 10\" (177.8 cm) (09/06/24 0916)  Weight - Scale: 107 kg (235 lb) (09/06/24 0916)  SpO2: 96 % (09/06/24 0725)  Exam:   Physical Exam  Vitals and nursing note reviewed.   Constitutional:       General: He is not in acute distress.     Appearance: Normal appearance. He is well-developed. He is obese. He is not ill-appearing.   HENT:      Head: Normocephalic and atraumatic.   Eyes:      Extraocular Movements: Extraocular movements intact.      Conjunctiva/sclera: Conjunctivae normal.      Pupils: Pupils are equal, round, and reactive to light.   Cardiovascular:      Rate and Rhythm: Normal rate and regular rhythm.      Pulses: Normal pulses.      Heart sounds: Normal heart sounds. No murmur heard.     No friction rub. No gallop.   Pulmonary:      Effort: Pulmonary effort is normal. No respiratory distress.      Breath sounds: Normal breath sounds. No wheezing or rales.   Abdominal:      General: There is no distension.      Palpations: Abdomen is soft.      Tenderness: There is no abdominal tenderness. There is no guarding.   Musculoskeletal:         General: No swelling.      Cervical back: Neck supple.      Right lower leg: No edema.      Left lower leg: No edema.   Skin:     General: Skin is warm and dry.      Capillary Refill: Capillary refill takes less than 2 seconds.   Neurological:      Mental Status: He is alert and oriented to person, place, and " time.   Psychiatric:         Mood and Affect: Mood normal.         Behavior: Behavior normal.          Discussion with Family: Declined to call his family.    Discharge instructions/Information to patient and family:   See after visit summary for information provided to patient and family.      Provisions for Follow-Up Care:  See after visit summary for information related to follow-up care and any pertinent home health orders.      Mobility at time of Discharge:   Basic Mobility Inpatient Raw Score: 24  JH-HLM Goal: 8: Walk 250 feet or more  JH-HLM Achieved: 7: Walk 25 feet or more  HLM Goal achieved. Continue to encourage appropriate mobility.     Disposition:   Home    Planned Readmission: None    Discharge Medications:  See after visit summary for reconciled discharge medications provided to patient and/or family.      **Please Note: This note may have been constructed using a voice recognition system**

## 2024-09-06 NOTE — TELEPHONE ENCOUNTER
9/5/2024 - present (1 day)  formerly [facility]    HFU/ SL KATE/ STROKE LIKE SYMPTOMS     DC-     ----- Message from Isabella George DO sent at 9/6/2024 10:02 AM EDT -----  Yehuda Dumasearn will need follow-up in  6-8 weeks  with neurovascular team for TIA in 60 minute appointment. They will require a routine EEG before the appointment.

## 2024-09-06 NOTE — UTILIZATION REVIEW
Initial Clinical Review    Admission: Date/Time/Statement:   Admission Orders (From admission, onward)       Ordered        09/05/24 1309  Place in Observation  Once                          Orders Placed This Encounter   Procedures    Place in Observation     Standing Status:   Standing     Number of Occurrences:   1     Order Specific Question:   Level of Care     Answer:   Med Surg [16]     ED Arrival Information       Expected   -    Arrival   9/5/2024 10:53    Acuity   Emergent              Means of arrival   Walk-In    Escorted by   Self    Service   Hospitalist    Admission type   Emergency              Arrival complaint   slurred speech             Chief Complaint   Patient presents with    Difficulty Speaking     Pt presents after an episode of difficulty speaking yesterday for approx 4 minutes. Slight left sided mouth droop that pt states is normal after an injury as a child. Pt is otherwise neurologically intact in triage.        Initial Presentation: 64 y.o. male  with a PMH significant for erectile l dysfunction who presents with strokelike symptoms with difficulty expressing himself and visual disturbance.  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 According to him, he cannot even put out a coherent sentence and had mild slurring of speech, too, at the time. According to him, this lasted about 2 to 4 minutes and resolved on its own. An hour prior to this episode of aphasia, he experienced some visual disturbance for which the patient described seeing black and white barcodes and this also lasted briefly. This morning, he spoke to his physician assistant as well as his primary care physician and he was advised to go to the emergency room for further evaluation and management. At present, patient denied any symptoms. Plan: Observation for stroke like symptoms, hyponatremia: stroke pathway, neurochecks,  permissive HTN, MRI brain, Echo, lipid profile and HgbA1c, telemetry, Neurology consult, IV fluids.     ED Triage Vitals   Temperature Pulse Respirations Blood Pressure SpO2 Pain Score   09/05/24 1059 09/05/24 1059 09/05/24 1059 09/05/24 1059 09/05/24 1059 09/05/24 1630   97.5 °F (36.4 °C) 77 18 (!) 201/88 99 % No Pain     Weight (last 2 days)       Date/Time Weight    09/05/24 1530 107 (235)            Vital Signs (last 3 days)       Date/Time Temp Pulse Resp BP MAP (mmHg) SpO2 O2 Device Patient Position - Orthostatic VS [place] Coma Scale Score Pain    09/06/24 07:25:47 97.5 °F (36.4 °C) 63 16 152/73 99 96 % None (Room air) Lying -- --    09/06/24 03:32:07 -- -- -- -- -- 97 % -- -- -- --    09/06/24 0330 98.1 °F (36.7 °C) 84 19 132/79 97 97 % -- Sitting 15 --    09/05/24 2330 98.4 °F (36.9 °C) 80 18 138/71 93 -- -- Lying 15 --    09/05/24 23:27:30 -- -- -- -- -- 98 % -- -- -- --    09/05/24 2130 98.2 °F (36.8 °C) 66 18 148/75 99 96 % None (Room air) Lying 15 --    09/05/24 1930 98.3 °F (36.8 °C) 68 18 146/76 99 98 % None (Room air) Lying 15 No Pain    09/05/24 1730 -- 66 18 143/84 -- 95 % None (Room air) -- 15 No Pain    09/05/24 1719 -- -- -- -- -- -- -- -- -- No Pain    09/05/24 1630 -- -- -- -- -- -- -- -- 15 No Pain    09/05/24 1530 -- 74 18 189/77 -- 97 % None (Room air) -- 15 --    09/05/24 1430 -- 64 18 181/79 114 98 % None (Room air) Sitting 15 --    09/05/24 1300 -- 60 18 158/75 105 95 % None (Room air) Sitting -- --    09/05/24 1200 -- 62 18 162/72 104 95 % None (Room air) Sitting -- --    09/05/24 1139 -- 63 -- 163/77 110 95 % -- -- -- --    09/05/24 1130 -- -- -- -- -- -- -- -- 15 --    09/05/24 1059 97.5 °F (36.4 °C) 77 18 201/88 -- 99 % -- Sitting -- --              Pertinent Labs/Diagnostic Test Results:   Radiology:  MRI brain wo contrast   Final Interpretation by Luis Manuel Romano MD (09/05 1720)      No acute infarction, edema, or mass effect.      Mild chronic microangiopathic ischemic  changes.      Workstation performed: [id number]         XR follow up   Final Interpretation by Laurent Watts DO (09/05 1531)      No radiopaque orbital foreign body.         Workstation performed: [id number]         CTA head and neck with and without contrast   Final Interpretation by Luis Villareal MD (09/05 1251)      CT Brain:  No acute intracranial abnormality.      CT Angiography:  Unremarkable CTA neck and brain.                  Workstation performed: [id number]           Cardiology:  No orders to display     GI:  No orders to display           Results from last 7 days   Lab Units 09/05/24  1928 09/05/24  1127   WBC Thousand/uL  --  9.48   HEMOGLOBIN g/dL  --  15.3   HEMATOCRIT %  --  46.1   PLATELETS Thousands/uL 266 303   TOTAL NEUT ABS Thousands/µL  --  6.24         Results from last 7 days   Lab Units 09/06/24  0340 09/05/24  1127   SODIUM mmol/L 135 133*   POTASSIUM mmol/L 4.3 4.8   CHLORIDE mmol/L 104 99   CO2 mmol/L 25 29   ANION GAP mmol/L 6 5   BUN mg/dL 13 11   CREATININE mg/dL 0.81 0.89   EGFR ml/min/1.73sq m 93 90   CALCIUM mg/dL 8.8 9.1     Results from last 7 days   Lab Units 09/05/24  1127   AST U/L 23   ALT U/L 19   ALK PHOS U/L 34   TOTAL PROTEIN g/dL 7.9   ALBUMIN g/dL 4.4   TOTAL BILIRUBIN mg/dL 0.46         Results from last 7 days   Lab Units 09/06/24  0340 09/05/24  1127   GLUCOSE RANDOM mg/dL 93 111         ED Treatment-Medication Administration from 09/05/2024 1053 to 09/05/2024 1840         Date/Time Order Dose Route Action     09/05/2024 1228 iohexol (OMNIPAQUE) 350 MG/ML injection (MULTI-DOSE) 85 mL 85 mL Intravenous Given     09/05/2024 1342 sodium chloride 0.9 % bolus 500 mL 500 mL Intravenous New Bag     09/05/2024 1441 sodium chloride 0.9 % infusion 100 mL/hr Intravenous New Bag     09/05/2024 1718 atorvastatin (LIPITOR) tablet 40 mg 40 mg Oral Given     09/05/2024 1442 enoxaparin (LOVENOX) subcutaneous injection 40 mg 40 mg Subcutaneous Given     09/05/2024 1043  clopidogrel (PLAVIX) tablet 300 mg 300 mg Oral Given     09/05/2024 1622 aspirin chewable tablet 243 mg 243 mg Oral Given            Past Medical History:   Diagnosis Date    Blood type A+     Other male erectile dysfunction 11/27/2018    Right ankle injury 11/04/2022     Present on Admission:   Hyponatremia      Admitting Diagnosis: Aphasia [R47.01]  TIA (transient ischemic attack) [G45.9]  Slurred speech [R47.81]  Stroke-like symptoms [R29.90]  Age/Sex: 64 y.o. male  Admission Orders:  Scheduled Medications:  aspirin, 81 mg, Oral, Daily  atorvastatin, 40 mg, Oral, QPM  clopidogrel, 75 mg, Oral, Daily  enoxaparin, 40 mg, Subcutaneous, Daily      Continuous IV Infusions:     PRN Meds:       IP CONSULT TO NEUROLOGY  IP CONSULT TO CASE MANAGEMENT  IP CONSULT TO NUTRITION SERVICES    Network Utilization Review Department  ATTENTION: Please call with any questions or concerns to [phone] and carefully listen to the prompts so that you are directed to the right person. All voicemails are confidential.   For Discharge needs, contact Care Management DC Support Team at [phone] opt. 2  Send all requests for admission clinical reviews, approved or denied determinations and any other requests to dedicated fax number below belonging to the campus where the patient is receiving treatment. List of dedicated fax numbers for the Facilities:  FACILITY NAME UR FAX NUMBER   ADMISSION DENIALS (Administrative/Medical Necessity) [phone]   DISCHARGE SUPPORT TEAM (NETWORK) [phone]   PARENT CHILD HEALTH (Maternity/NICU/Pediatrics) [phone]   [facility] [phone]   [facility] [phone]   [facility] [phone]   [facility] [phone]   [facility] [phone]   [facility] [phone]   [facility] [phone]   [facility]  [facility] [phone]   [facility] [phone]   [facility] [phone]   [facility] [phone]   [facility] [phone]

## 2024-09-06 NOTE — PROGRESS NOTES
NEUROLOGY RESIDENCY PROGRESS NOTE     Name: Yehuda Trejo   Age & Sex: 64 y.o. male   MRN: [id number]  Unit/Bed#: S -01   Encounter: [id number]    Yehuda Trejo will need follow-up in  6-8 weeks  with neurovascular team for TIA in 60 minute appointment. They will require a routine EEG before the appointment.    Pending for discharge: Echocardiogram    ASSESSMENT & PLAN     * Stroke-like symptoms  Assessment & Plan  64-year-old right-handed male presents after episode of  expressive aphasia on 09/04 around 1100 that lasted 2 minutes. Did also had an episode of visual disturbance on 09/4 around 7 am lasting aprox 20s. No other deficits suchs as blurry vision, paresthesias, weakness. No acute illness, no HA, palpitation, HA. Does have hx of similar event about 8 months ago.     Presenting deficits were: Episode of speaking difficulties for 4 mins  Interventions: Patient not a candidate. Unclear time of onset outside appropriate time window.     Workup:  Lab Results   Component Value Date    HGBA1C 5.3 09/06/2024    CHOLESTEROL 161 09/06/2024    LDLCALC 96 09/06/2024    TRIG 73 09/06/2024      CTH: No acute intracranial abnormality.   CTA: Unremarkable CTA neck and brain.   MRI: Mild microangiopathic changes. No acute infarction, edema or mass effect.  TTE/JOHN: Pending    Impression: Patient is a 64 year old male who presented to the the ED for stroke-like symptoms which resolved. At this time, suspect etiology  TIA and a component of hypertensive encephalopathy given persistent hypertension upon presentation and visual disturbances as well as patient does have history of a similar episode. In regards with visual disturbances and stereotypical sx can also consider focal sx/migraine    Plan:  Case discussed with Dr. Mahoney  Continue DAPT for total 21 days until 09/25 then recommend continue with ASA monotherapy in the setting TIA  Fortunately biochemical lipid panel overall unremarkable however LDL above 70 mg/dL  recommend continue with Jaret intensity statin repeat Lipid Panel in about 6 months consider de escalate afterwards if unable tolerate or LDL goes below 50 mg/dL.  Will also recommend EEG on the outpatient setting due to visual disturbances and stereotypical sx ruled Focal events.    Echocardiogram pending  Telemetry no arrythmias  Stroke labs: completed  Healthy diet encouraged  PT/OT versus active exercise discussed  BP management and HTN med compliance discussed recommend normotension will defer to primary team.    Will need follow-up in 6-8 weeks with neurovascular team for TIA .They will require a routine EEG before the appointment.  Neurology will sign off.   Rest of care as per primary care team           SUBJECTIVE     Patient was seen and examined. No acute events overnight.  Denies any acute complaint.  No episodes of visual disturbances, no numbness weakness or speech difficulties.    Pertinent Negatives include: numbness, weakness, speech or visual changes, headaches, paralysis/weakness, numbness or tingling, involuntary movements, speech impairment     Review of Systems   Constitutional:  Negative for chills and fever.   HENT:  Negative for ear pain and sore throat.    Eyes:  Negative for pain and visual disturbance.   Respiratory:  Negative for cough and shortness of breath.    Cardiovascular:  Negative for chest pain and palpitations.   Gastrointestinal:  Negative for abdominal pain and vomiting.   Genitourinary:  Negative for dysuria and hematuria.   Musculoskeletal:  Negative for arthralgias and back pain.   Skin:  Negative for color change and rash.   Neurological:  Negative for seizures and syncope.   All other systems reviewed and are negative.      OBJECTIVE     Patient ID: Yehuda Trejo is a 64 y.o. male.    Vitals:    09/06/24 0330 09/06/24 0332 09/06/24 0725 09/06/24 0916   BP: 132/79  152/73 152/73   BP Location: Right arm  Right arm    Pulse: 84  63 63   Resp: 19  16    Temp: 98.1 °F (36.7  "°C)  97.5 °F (36.4 °C)    TempSrc: Oral  Oral    SpO2: 97% 97% 96%    Weight:    107 kg (235 lb)   Height:    5' 10\" (1.778 m)      Temperature:   Temp (24hrs), Av.1 °F (36.7 °C), Min:97.5 °F (36.4 °C), Max:98.4 °F (36.9 °C)    Temperature: 97.5 °F (36.4 °C)      Physical Exam  Vitals and nursing note reviewed.   Constitutional:       General: He is not in acute distress.     Appearance: He is well-developed.   HENT:      Head: Normocephalic and atraumatic.   Eyes:      Conjunctiva/sclera: Conjunctivae normal.   Cardiovascular:      Rate and Rhythm: Normal rate and regular rhythm.      Heart sounds: No murmur heard.  Pulmonary:      Effort: Pulmonary effort is normal. No respiratory distress.      Breath sounds: Normal breath sounds.   Abdominal:      Palpations: Abdomen is soft.      Tenderness: There is no abdominal tenderness.   Musculoskeletal:         General: No swelling.      Cervical back: Neck supple.   Skin:     General: Skin is warm and dry.      Capillary Refill: Capillary refill takes less than 2 seconds.   Neurological:      Mental Status: He is alert and oriented to person, place, and time.      Cranial Nerves: Cranial nerves 2-12 are intact.      Motor: Motor strength is normal.     Gait: Gait is intact.      Deep Tendon Reflexes:      Reflex Scores:       Tricep reflexes are 2+ on the right side and 2+ on the left side.       Bicep reflexes are 2+ on the right side and 2+ on the left side.       Brachioradialis reflexes are 2+ on the right side and 2+ on the left side.       Patellar reflexes are 2+ on the right side and 2+ on the left side.       Achilles reflexes are 2+ on the right side and 2+ on the left side.  Psychiatric:         Mood and Affect: Mood normal.          Neurologic Exam     Mental Status   Oriented to person, place, and time.   Level of consciousness: alert    Cranial Nerves   Cranial nerves II through XII intact.     Motor Exam   Muscle bulk: normal  Overall muscle tone: " normal    Strength   Strength 5/5 throughout.   Right neck flexion: 5/5  Left neck flexion: 5/5  Right neck extension: 5/5  Left neck extension: 5/5  Right deltoid: 5/5  Left deltoid: 5/5  Right biceps: 5/5  Left biceps: 5/5  Right triceps: 5/5  Left triceps: 5/5  Right wrist flexion: 5/5  Left wrist flexion: 5/5  Right wrist extension: 5/5  Left wrist extension: 5/5  Right interossei: 5/5  Left interossei: 5/5  Right abdominals: 5/5  Left abdominals: 5/5  Right iliopsoas: 5/5  Left iliopsoas: 5/5  Right quadriceps: 5/5  Left quadriceps: 5/5  Right hamstrin/5  Left hamstrin/5  Right glutei: 5/5  Left glutei: 5/5  Right anterior tibial: 5/5  Left anterior tibial: 5/5  Right posterior tibial: 5/5  Left posterior tibial: 5/5  Right peroneal: 5/5  Left peroneal: 5/5  Right gastroc: 5/5  Left gastroc: 5/5    Sensory Exam   Light touch normal.     Gait, Coordination, and Reflexes     Gait  Gait: normal    Reflexes   Right brachioradialis: 2+  Left brachioradialis: 2+  Right biceps: 2+  Left biceps: 2+  Right triceps: 2+  Left triceps: 2+  Right patellar: 2+  Left patellar: 2+  Right achilles: 2+  Left achilles: 2+  Right : 2+  Left : 2+         LABORATORY DATA     Labs: I have personally reviewed pertinent reports.    Results from last 7 days   Lab Units 24  1928 24  1127   WBC Thousand/uL  --  9.48   HEMOGLOBIN g/dL  --  15.3   HEMATOCRIT %  --  46.1   PLATELETS Thousands/uL 266 303   SEGS PCT %  --  65   MONO PCT %  --  11   EOS PCT %  --  1      Results from last 7 days   Lab Units 24  0340 24  1127   SODIUM mmol/L 135 133*   POTASSIUM mmol/L 4.3 4.8   CHLORIDE mmol/L 104 99   CO2 mmol/L 25 29   BUN mg/dL 13 11   CREATININE mg/dL 0.81 0.89   CALCIUM mg/dL 8.8 9.1   ALK PHOS U/L  --  34   ALT U/L  --  19   AST U/L  --  23                            IMAGING & DIAGNOSTIC TESTING     Radiology Results: I have personally reviewed pertinent reports.      MRI brain wo contrast   Final  Result by Luis Manuel Romano MD (09/05 1720)      No acute infarction, edema, or mass effect.      Mild chronic microangiopathic ischemic changes.      Workstation performed: [id number]         XR follow up   Final Result by Laurent Watts DO (09/05 1531)      No radiopaque orbital foreign body.         Workstation performed: [id number]         CTA head and neck with and without contrast   Final Result by Luis Villareal MD (09/05 1251)      CT Brain:  No acute intracranial abnormality.      CT Angiography:  Unremarkable CTA neck and brain.                  Workstation performed: [id number]             Other Diagnostic Testing: I have personally reviewed pertinent reports.      ACTIVE MEDICATIONS     Current Facility-Administered Medications   Medication Dose Route Frequency    aspirin chewable tablet 81 mg  81 mg Oral Daily    atorvastatin (LIPITOR) tablet 40 mg  40 mg Oral QPM    clopidogrel (PLAVIX) tablet 75 mg  75 mg Oral Daily    enoxaparin (LOVENOX) subcutaneous injection 40 mg  40 mg Subcutaneous Daily       Prior to Admission medications    Medication Sig Start Date End Date Taking? Authorizing Provider   predniSONE 10 mg tablet Take 4 tablets daily for 4 days then 3 tablets daily for 3 days then 2 tablets daily for 2 days then 1 tablet daily for 1 day then stop.  Patient not taking: Reported on 7/19/2024 8/4/22   BEATA Palacio   sildenafil (VIAGRA) 50 MG tablet Take 1 tablet (50 mg total) by mouth daily as needed for erectile dysfunction 10/5/21   BEATA Palacio         VTE Pharmacologic Prophylaxis: VTE covered by:  enoxaparin, Subcutaneous, 40 mg at 09/06/24 0826      VTE Mechanical Prophylaxis: sequential compression device    ======    I have discussed the patient's history, physical exam findings, assessment, and plan in detail with attending, Dr. Galarza    Thank you for allowing me to participate in the care of your patient, Yehuda Trejo.    Geneva Howard,  MD [facility], PGY-3

## 2024-09-06 NOTE — PLAN OF CARE
Problem: SAFETY ADULT  Goal: Maintain or return to baseline ADL function  Description: INTERVENTIONS:  -  Assess patient's ability to carry out ADLs; assess patient's baseline for ADL function and identify physical deficits which impact ability to perform ADLs (bathing, care of mouth/teeth, toileting, grooming, dressing, etc.)  - Assess/evaluate cause of self-care deficits   - Assess range of motion  - Assess patient's mobility; develop plan if impaired  - Assess patient's need for assistive devices and provide as appropriate  - Encourage maximum independence but intervene and supervise when necessary  - Involve family in performance of ADLs  - Assess for home care needs following discharge   - Consider OT consult to assist with ADL evaluation and planning for discharge  - Provide patient education as appropriate  Outcome: Progressing  Goal: Maintains/Returns to pre admission functional level  Description: INTERVENTIONS:  - Perform AM-PAC 6 Click Basic Mobility/ Daily Activity assessment daily.  - Set and communicate daily mobility goal to care team and patient/family/caregiver.   - Collaborate with rehabilitation services on mobility goals if consulted  - Perform Range of Motion  times a day.  - Reposition patient every  hours.  - Dangle patient  times a day  - Stand patient  times a day  - Ambulate patient  times a day  - Out of bed to chair  times a day   - Out of bed for meals  times a day  - Out of bed for toileting  - Record patient progress and toleration of activity level   Outcome: Progressing

## 2024-09-06 NOTE — PLAN OF CARE
Problem: SAFETY ADULT  Goal: Maintain or return to baseline ADL function  Description: INTERVENTIONS:  -  Assess patient's ability to carry out ADLs; assess patient's baseline for ADL function and identify physical deficits which impact ability to perform ADLs (bathing, care of mouth/teeth, toileting, grooming, dressing, etc.)  - Assess/evaluate cause of self-care deficits   - Assess range of motion  - Assess patient's mobility; develop plan if impaired  - Assess patient's need for assistive devices and provide as appropriate  - Encourage maximum independence but intervene and supervise when necessary  - Involve family in performance of ADLs  - Assess for home care needs following discharge   - Consider OT consult to assist with ADL evaluation and planning for discharge  - Provide patient education as appropriate  9/6/2024 1158 by Kassidy Marcos RN  Outcome: Progressing  9/6/2024 1107 by Kassidy Marcos RN  Outcome: Progressing  Goal: Maintains/Returns to pre admission functional level  Description: INTERVENTIONS:  - Perform AM-PAC 6 Click Basic Mobility/ Daily Activity assessment daily.  - Set and communicate daily mobility goal to care team and patient/family/caregiver.   - Collaborate with rehabilitation services on mobility goals if consulted  - Perform Range of Motion  times a day.  - Reposition patient every  hours.  - Dangle patient  times a day  - Stand patient  times a day  - Ambulate patient  times a day  - Out of bed to chair  times a day   - Out of bed for meals  times a day  - Out of bed for toileting  - Record patient progress and toleration of activity level   9/6/2024 1158 by Kassidy Marcos RN  Outcome: Progressing  9/6/2024 1107 by Kassidy Marcos RN  Outcome: Progressing

## 2024-09-06 NOTE — OCCUPATIONAL THERAPY NOTE
Occupational Therapy Evaluation     Patient Name: Yehuda Trejo  Today's Date: 9/6/2024  Problem List  Principal Problem:    Stroke-like symptoms  Active Problems:    Hyponatremia    Past Medical History  Past Medical History:   Diagnosis Date    Blood type A+     Other male erectile dysfunction 11/27/2018    Right ankle injury 11/04/2022     Past Surgical History  Past Surgical History:   Procedure Laterality Date    HERNIA REPAIR Left     twice repaired in past    TONSILLECTOMY               09/06/24 1013   OT Last Visit   OT Visit Date 09/06/24   Note Type   Note type Evaluation   Pain Assessment   Pain Assessment Tool 0-10   Pain Score No Pain   Restrictions/Precautions   Weight Bearing Precautions Per Order No   Home Living   Type of Home House   Home Layout Two level;Bed/bath upstairs  (5 NAM from front, 0 NAM from back)   Bathroom Shower/Tub Tub/shower unit   Bathroom Toilet Standard   Bathroom Accessibility Accessible   Home Equipment   (none)   Prior Function   Level of [place] Independent with ADLs;Independent with functional mobility;Independent with IADLS   Lives With Spouse   IADLs Independent with driving;Independent with meal prep;Independent with medication management   Falls in the last 6 months 0   Vocational Full time employment   Lifestyle   Autonomy Patient lives with wife in two-story home 0 steps to enter from the back of house.  Patient was independent for all I/ADLs, independent ambulator, + working/.   Reciprocal Relationships Supportive wife   Service to Others Working full-time   Intrinsic Gratification Walking   General   Family/Caregiver Present No   ADL   Eating Assistance 7  Independent   Grooming Assistance 7  Independent   UB Bathing Assistance 7  Independent   LB Bathing Assistance 7  Independent   UB Dressing Assistance 7  Independent   LB Dressing Assistance 7  Independent   Bed Mobility   Additional Comments OOB at start of session   Transfers   Sit to Stand 7   Independent   Stand to Sit 7  Independent   Functional Mobility   Functional Mobility 7  Independent   Additional items   (no DME)   Activity Tolerance   Activity Tolerance Patient tolerated treatment well   RUE Assessment   RUE Assessment WNL   LUE Assessment   LUE Assessment WNL   Hand Function   Fine Motor Coordination Functional   Hand Function Comments FM coordination WNL   Sensation   Light Touch No apparent deficits   Vision - Complex Assessment   Ocular Range of Motion Intact   Tracking Intact   Saccades Intact   Cognition   Overall Cognitive Status WFL   Arousal/Participation Alert;Cooperative   Attention Within functional limits   Orientation Level Oriented X4   Following Commands Follows all commands and directions without difficulty   Assessment   Assessment Pt is a 64 y.o. male seen for OT evaluation s/p admission to [facility] on [facility]/5/2024 due to aphagia episode. Diagnosed with Stroke-like symptoms. Personal and env factors supporting pt at time of IE include age, (I) PLOF, supportive wife, and no NAM. Pt was living with his wife in a 2 ST, 0STE  at  Indep level for all I/ADLs, Indep ambulation +working FT and driving, no DME usage prior to admission. Pt is Indep for UB/LB ADLs, Indep sit<>stand and functional ambulation in room and hallway w/o AD. Despite pt's current functional limitations and medical complications pt is functioning at baseline. No further acute OT needs identified at this time. Recommend continued active ADL participation and mobilization with hospital staff while in the hospital to increase pt’s endurance and strength upon D/C. From OT standpoint, recommend D/C home with family support when medically cleared. D/C pt from OT caseload at this time.   Plan   OT Treatment Day 0   OT Frequency Eval only   Discharge Recommendation   Rehab Resource Intensity Level, OT No post-acute rehabilitation needs   AM-PAC Daily Activity Inpatient   Lower Body Dressing 4   Bathing 4    Toileting 4   Upper Body Dressing 4   Grooming 4   Eating 4   Daily Activity Raw Score 24   Daily Activity Standardized Score (Calc for Raw Score >=11) 57.54   AM-PAC Applied Cognition Inpatient   Following a Speech/Presentation 4   Understanding Ordinary Conversation 4   Taking Medications 4   Remembering Where Things Are Placed or Put Away 4   Remembering List of 4-5 Errands 4   Taking Care of Complicated Tasks 4   Applied Cognition Raw Score 24   Applied Cognition Standardized Score 62.21   End of Consult   Education Provided Yes   Patient Position at End of Consult Bedside chair;All needs within reach

## 2024-09-06 NOTE — PLAN OF CARE
Problem: SAFETY ADULT  Goal: Maintain or return to baseline ADL function  Description: INTERVENTIONS:  -  Assess patient's ability to carry out ADLs; assess patient's baseline for ADL function and identify physical deficits which impact ability to perform ADLs (bathing, care of mouth/teeth, toileting, grooming, dressing, etc.)  - Assess/evaluate cause of self-care deficits   - Assess range of motion  - Assess patient's mobility; develop plan if impaired  - Assess patient's need for assistive devices and provide as appropriate  - Encourage maximum independence but intervene and supervise when necessary  - Involve family in performance of ADLs  - Assess for home care needs following discharge   - Consider OT consult to assist with ADL evaluation and planning for discharge  - Provide patient education as appropriate  9/6/2024 1435 by Kassidy Marcos RN  Outcome: Completed  9/6/2024 1158 by Kassidy Marcos RN  Outcome: Progressing  9/6/2024 1107 by Kassidy Marcos RN  Outcome: Progressing  Goal: Maintains/Returns to pre admission functional level  Description: INTERVENTIONS:  - Perform AM-PAC 6 Click Basic Mobility/ Daily Activity assessment daily.  - Set and communicate daily mobility goal to care team and patient/family/caregiver.   - Collaborate with rehabilitation services on mobility goals if consulted  - Perform Range of Motion  times a day.  - Reposition patient every  hours.  - Dangle patient  times a day  - Stand patient  times a day  - Ambulate patient  times a day  - Out of bed to chair  times a day   - Out of bed for meals  times a day  - Out of bed for toileting  - Record patient progress and toleration of activity level   9/6/2024 1435 by Kassidy Marcos RN  Outcome: Completed  9/6/2024 1158 by Kassidy Marcos RN  Outcome: Progressing  9/6/2024 1107 by Kassidy Marcos RN  Outcome: Progressing

## 2024-09-06 NOTE — SPEECH THERAPY NOTE
Speech Language/Pathology  Order received, chart reviewed. NIH 0. Pt passed nursing dysphagia screen and has been tolerating regular texture diet, thin liquids, and PO meds. Spoke w/ RN, RN denies concerns for dysphagia, aspiration, and/or speech/language deficits. Spoke w/ pt. Pt denies dysphagia, odynophagia, globus sensation, s/s aspiration, and/or speech/language deficits at this time. No gross speech/language deficits observed during conversation. MRI negative for acute intracranial hemorrhage or evidence of recent infarction. Formal ST evaluation not indicated at this time. Please re-consult if medically necessary.

## 2024-09-06 NOTE — DISCHARGE INSTR - AVS FIRST PAGE
Dear Yehuda Trejo,     It was our pleasure to care for you here at [facility].  It is our hope that we were always able to exceed the expected standards for your care during your stay.  You were hospitalized due to transient ischemic attack.  You were cared for on the third floor by Josemanuel Welch MD under the service of Bi Mares MD with the [facility] who covers for your primary care physician (PCP), BEATA Palacio, while you were hospitalized.  If you have any questions or concerns related to this hospitalization, you may contact us at .  For follow up as well as any medication refills, we recommend that you follow up with your primary care physician.  A registered nurse will reach out to you by phone within a few days after your discharge to answer any additional questions that you may have after going home.  However, at this time we provide for you here, the most important instructions / recommendations at discharge:     Notable Medication Adjustments -   Take Lipitor 40 mg daily  Take aspirin 81 mg daily  Take Plavix 75 mg daily for 21 days only  Take Norvasc 5 mg daily  Testing Required after Discharge -   EEG, this needs to be ordered by your PCP or neurologist  ** Please contact your PCP to request testing orders for any of the testing recommended here **  Important follow up information -   Please follow-up with your neurologist, referral is given to see Dr. Cantrell  Follow-up with your PCP within 2 weeks of discharge  Other Instructions -   None  Please review this entire after visit summary as additional general instructions including medication list, appointments, activity, diet, any pertinent wound care, and other additional recommendations from your care team that may be provided for you.      Sincerely,     Josemanuel Welch MD

## 2024-09-09 ENCOUNTER — TRANSITIONAL CARE MANAGEMENT (OUTPATIENT)
Dept: INTERNAL MEDICINE CLINIC | Facility: CLINIC | Age: 64
End: 2024-09-09

## 2024-09-12 ENCOUNTER — OFFICE VISIT (OUTPATIENT)
Dept: INTERNAL MEDICINE CLINIC | Facility: CLINIC | Age: 64
End: 2024-09-12
Payer: COMMERCIAL

## 2024-09-12 VITALS
HEIGHT: 69 IN | DIASTOLIC BLOOD PRESSURE: 74 MMHG | TEMPERATURE: 97.1 F | WEIGHT: 232 LBS | OXYGEN SATURATION: 99 % | HEART RATE: 77 BPM | BODY MASS INDEX: 34.36 KG/M2 | SYSTOLIC BLOOD PRESSURE: 122 MMHG

## 2024-09-12 DIAGNOSIS — E87.1 HYPONATREMIA: ICD-10-CM

## 2024-09-12 DIAGNOSIS — Z12.11 SCREENING FOR COLON CANCER: ICD-10-CM

## 2024-09-12 DIAGNOSIS — R29.90 STROKE-LIKE SYMPTOMS: Primary | ICD-10-CM

## 2024-09-12 DIAGNOSIS — G45.9 TIA (TRANSIENT ISCHEMIC ATTACK): ICD-10-CM

## 2024-09-12 DIAGNOSIS — Z12.5 SCREENING FOR PROSTATE CANCER: ICD-10-CM

## 2024-09-12 DIAGNOSIS — I10 PRIMARY HYPERTENSION: ICD-10-CM

## 2024-09-12 PROCEDURE — 99495 TRANSJ CARE MGMT MOD F2F 14D: CPT | Performed by: NURSE PRACTITIONER

## 2024-09-12 RX ORDER — AMLODIPINE BESYLATE 10 MG/1
10 TABLET ORAL DAILY
Qty: 90 TABLET | Refills: 0 | Status: SHIPPED | OUTPATIENT
Start: 2024-09-12

## 2024-09-12 NOTE — PROGRESS NOTES
Transition of Care Visit  Name: Yehuda Trejo      : 1960      MRN: [id number]  Encounter Provider: BEATA Palacio  Encounter Date: 2024   Encounter department: [facility]    Assessment & Plan  Stroke-like symptoms  CTA head and neck and MRI negative.   On asa, plavix, and statin.  Schedule follow up with neurology.     Orders:    amLODIPine (NORVASC) 10 mg tablet; Take 1 tablet (10 mg total) by mouth daily    Primary hypertension  BP up per home readings.   Increase amlodipine to 10 mg daily.        Hyponatremia  Normal at discharge.   Recheck in 2 weeks.     Orders:    Basic metabolic panel; Future    TIA (transient ischemic attack)    Orders:    amLODIPine (NORVASC) 10 mg tablet; Take 1 tablet (10 mg total) by mouth daily    Screening for colon cancer    Orders:    Ambulatory Referral to Colorectal Surgery; Future    Screening for prostate cancer    Orders:    PSA, Total Screen; Future         History of Present Illness     Transitional Care Management Review:   Yehuda Trejo is a 64 y.o. male here for TCM follow up.   He was hospitalized from - with stroke like symptoms- dysarthria and expressive aphasia. CTA head and neck and MRI were unremarkable. Echo unremarkable. Neurology started him on plavix and asa for 21 days. Symptoms thought to be caused by hypertension. He was started on amlodipine 5 mg daily. He was recommended to follow up with neurology. Needs outpatient EEG.     He is doing well. He has been walking daily up to 5 miles. Eating a low carb diet.   Blood pressures at home run around 140-150/70-80's.     During the TCM phone call patient stated:  TCM Call       Date and time call was made  2024  8:24 AM    Hospital care reviewed  Records reviewed    Patient was hospitialized at  [facility]    Date of Admission  24    Date of discharge  24    Diagnosis  stroke like symptoms    Disposition  Home    Were the patients  "medications reviewed and updated  No    Current Symptoms  None          TCM Call       Post hospital issues  None    Scheduled for follow up?  Yes    Did you obtain your prescribed medications  Yes    Do you need help managing your prescriptions or medications  No    Is transportation to your appointment needed  No    I have advised the patient to call PCP with any new or worsening symptoms  Ellie Coates    Living Arrangements  Spouse or Significiant other    Support System  Spouse    The type of support provided  Emotional    Are you recieving any outpatient services  No    Are you recieving home care services  No    Are you using any community resources  No    Current waiver services  No    Have you fallen in the last 12 months  No    Interperter language line needed  No            Review of Systems   Constitutional:  Negative for activity change, appetite change and fatigue.   Respiratory:  Negative for cough and shortness of breath.    Cardiovascular:  Negative for chest pain, palpitations and leg swelling.   Gastrointestinal:  Negative for abdominal pain.   Neurological:  Negative for dizziness, seizures, facial asymmetry, speech difficulty, weakness, light-headedness, numbness and headaches.     Objective     /74   Pulse 77   Temp (!) 97.1 °F (36.2 °C)   Ht 5' 9\" (1.753 m)   Wt 105 kg (232 lb)   SpO2 99%   BMI 34.26 kg/m²     Physical Exam  Vitals reviewed.   Constitutional:       Appearance: Normal appearance.   HENT:      Head: Normocephalic and atraumatic.   Eyes:      Conjunctiva/sclera: Conjunctivae normal.      Pupils: Pupils are equal, round, and reactive to light.   Cardiovascular:      Rate and Rhythm: Normal rate and regular rhythm.      Heart sounds: Normal heart sounds.   Pulmonary:      Effort: Pulmonary effort is normal.      Breath sounds: Normal breath sounds.   Skin:     General: Skin is warm and dry.   Neurological:      General: No focal deficit present.      Mental Status: He " is alert and oriented to person, place, and time.   Psychiatric:         Mood and Affect: Mood normal.         Behavior: Behavior normal.       Medications have been reviewed by provider in current encounter    Administrative Statements

## 2024-09-12 NOTE — ASSESSMENT & PLAN NOTE
CTA head and neck and MRI negative.   On asa, plavix, and statin.  Schedule follow up with neurology.     Orders:    amLODIPine (NORVASC) 10 mg tablet; Take 1 tablet (10 mg total) by mouth daily

## 2024-09-18 ENCOUNTER — NURSE TRIAGE (OUTPATIENT)
Age: 64
End: 2024-09-18

## 2024-09-18 NOTE — TELEPHONE ENCOUNTER
"Pt called stating that he feels like his BP is still not regulated. Pt was recently hospitalized with stroke-like symptoms and was seen in office on 9/12. Pt amlodipine dose was adjusted. States that he takes it at lunch. Pt BP with am was 144/?. Pt states that he was not \"feeling right\" around lunch, took his BP and it read 168/83. RN had pt check BP while on phone and it was 177/96. Pt would like PCP to advise if a dose increase or medication change is appropriate. Pt denies SOB, chest pain, headache, dizziness. Pt will monitor BP and go to ED if symptoms worsen. Please advise.     Reason for Disposition   Systolic BP >= 160 OR Diastolic >= 100    Answer Assessment - Initial Assessment Questions  1. BLOOD PRESSURE: \"What is the blood pressure?\" \"Did you take at least two measurements 5 minutes apart?\"      144/? This morning 168/83 this afternoon  2. ONSET: \"When did you take your blood pressure?\"      This morning  3. HOW: \"How did you obtain the blood pressure?\" (e.g., visiting nurse, automatic home BP monitor)      Home BP machine  4. HISTORY: \"Do you have a history of high blood pressure?\"      Yes  5. MEDICATIONS: \"Are you taking any medications for blood pressure?\" \"Have you missed any doses recently?\"      Amlodipine  6. OTHER SYMPTOMS: \"Do you have any symptoms?\" (e.g., headache, chest pain, blurred vision, difficulty breathing, weakness)      Fatigue    Protocols used: Blood Pressure - High-ADULT-OH    "

## 2024-09-18 NOTE — TELEPHONE ENCOUNTER
Regarding: High BP  ----- Message from Holley MENDIOLA sent at 9/18/2024  1:53 PM EDT -----  Patient was in for stoke like symptoms and he wanted speak with nurse  because his BP is 168/83

## 2024-09-19 DIAGNOSIS — I10 PRIMARY HYPERTENSION: Primary | ICD-10-CM

## 2024-09-19 RX ORDER — LISINOPRIL 5 MG/1
5 TABLET ORAL DAILY
Qty: 30 TABLET | Refills: 0 | Status: SHIPPED | OUTPATIENT
Start: 2024-09-19

## 2024-09-19 NOTE — TELEPHONE ENCOUNTER
Continue amlodipine 10 mg daily  Add lisinopril 5 mg daily to regimen.   He has a nurse visit scheduled 10/1, can you please make an office visit with me instead of the nurse visit.

## 2024-09-19 NOTE — TELEPHONE ENCOUNTER
That is not a typical side effect of the medication. Recommend starting famotidine 20 mg twice daily. Avoid high acid foods like coffee, alcohol, citrus, tomatoes, garlic, onion, and chocolate.

## 2024-09-19 NOTE — TELEPHONE ENCOUNTER
Patient was notified and understands. Patient concerned about indigestion he is having with the medication, is there something to help that?

## 2024-10-01 ENCOUNTER — OFFICE VISIT (OUTPATIENT)
Dept: INTERNAL MEDICINE CLINIC | Facility: CLINIC | Age: 64
End: 2024-10-01
Payer: COMMERCIAL

## 2024-10-01 ENCOUNTER — PROCEDURE VISIT (OUTPATIENT)
Dept: OBGYN CLINIC | Facility: CLINIC | Age: 64
End: 2024-10-01
Payer: COMMERCIAL

## 2024-10-01 VITALS
DIASTOLIC BLOOD PRESSURE: 80 MMHG | BODY MASS INDEX: 34.36 KG/M2 | HEART RATE: 69 BPM | OXYGEN SATURATION: 98 % | WEIGHT: 232 LBS | TEMPERATURE: 96.5 F | SYSTOLIC BLOOD PRESSURE: 122 MMHG | HEIGHT: 69 IN

## 2024-10-01 VITALS
BODY MASS INDEX: 34.36 KG/M2 | DIASTOLIC BLOOD PRESSURE: 79 MMHG | WEIGHT: 232 LBS | HEIGHT: 69 IN | SYSTOLIC BLOOD PRESSURE: 149 MMHG | HEART RATE: 81 BPM

## 2024-10-01 DIAGNOSIS — E87.1 HYPONATREMIA: ICD-10-CM

## 2024-10-01 DIAGNOSIS — M16.11 PRIMARY OSTEOARTHRITIS OF ONE HIP, RIGHT: Primary | ICD-10-CM

## 2024-10-01 DIAGNOSIS — R29.90 STROKE-LIKE SYMPTOMS: ICD-10-CM

## 2024-10-01 DIAGNOSIS — I10 PRIMARY HYPERTENSION: Primary | ICD-10-CM

## 2024-10-01 PROCEDURE — 99214 OFFICE O/P EST MOD 30 MIN: CPT | Performed by: NURSE PRACTITIONER

## 2024-10-01 PROCEDURE — 99213 OFFICE O/P EST LOW 20 MIN: CPT | Performed by: PHYSICAL MEDICINE & REHABILITATION

## 2024-10-01 PROCEDURE — 20611 DRAIN/INJ JOINT/BURSA W/US: CPT | Performed by: PHYSICAL MEDICINE & REHABILITATION

## 2024-10-01 RX ORDER — LISINOPRIL 5 MG/1
5 TABLET ORAL DAILY
Qty: 90 TABLET | Refills: 1 | Status: SHIPPED | OUTPATIENT
Start: 2024-10-01

## 2024-10-01 RX ORDER — ROPIVACAINE HYDROCHLORIDE 5 MG/ML
10 INJECTION, SOLUTION EPIDURAL; INFILTRATION; PERINEURAL
Status: COMPLETED | OUTPATIENT
Start: 2024-10-01 | End: 2024-10-01

## 2024-10-01 RX ORDER — TRIAMCINOLONE ACETONIDE 40 MG/ML
80 INJECTION, SUSPENSION INTRA-ARTICULAR; INTRAMUSCULAR
Status: COMPLETED | OUTPATIENT
Start: 2024-10-01 | End: 2024-10-01

## 2024-10-01 RX ADMIN — ROPIVACAINE HYDROCHLORIDE 10 ML: 5 INJECTION, SOLUTION EPIDURAL; INFILTRATION; PERINEURAL at 08:00

## 2024-10-01 RX ADMIN — TRIAMCINOLONE ACETONIDE 80 MG: 40 INJECTION, SUSPENSION INTRA-ARTICULAR; INTRAMUSCULAR at 08:00

## 2024-10-01 NOTE — PROGRESS NOTES
"Ambulatory Visit  Name: Yehuda Trejo      : 1960      MRN: [id number]  Encounter Provider: BEATA Palacio  Encounter Date: 10/1/2024   Encounter department: [facility]    Assessment & Plan  Primary hypertension  BP have improved   Continue amlodipine 5 mg twice daily and lisinopril 5 mg daily  Recommend home blood pressure monitoring     Orders:    lisinopril (ZESTRIL) 5 mg tablet; Take 1 tablet (5 mg total) by mouth daily    Stroke-like symptoms  On asa, statin, and plavix.   Sees neurology this month.          Hyponatremia  Due for repeat labs.             History of Present Illness     Yehuda is here today for follow up hypertension   His blood pressures have improved  BP at home 119-140/70-80  He couldn't tolerate amlodipine 10 mg daily but he has been taking 5 mg twice daily and he has felt fine.               Review of Systems   Constitutional:  Negative for activity change, appetite change and fatigue.   Respiratory:  Negative for cough and shortness of breath.    Cardiovascular:  Negative for chest pain.   Gastrointestinal:  Negative for abdominal pain.   Neurological:  Negative for dizziness, light-headedness and headaches.           Objective     /80   Pulse 69   Temp (!) 96.5 °F (35.8 °C)   Ht 5' 9\" (1.753 m)   Wt 105 kg (232 lb)   SpO2 98%   BMI 34.26 kg/m²     Physical Exam  Vitals reviewed.   Constitutional:       Appearance: Normal appearance.   HENT:      Head: Normocephalic and atraumatic.   Eyes:      Conjunctiva/sclera: Conjunctivae normal.   Cardiovascular:      Rate and Rhythm: Normal rate and regular rhythm.      Heart sounds: Normal heart sounds.   Pulmonary:      Effort: Pulmonary effort is normal.      Breath sounds: Normal breath sounds.   Musculoskeletal:      Right lower leg: No edema.      Left lower leg: No edema.   Neurological:      Mental Status: He is alert and oriented to person, place, and time.   Psychiatric:         Mood " and Affect: Mood normal.         Behavior: Behavior normal.

## 2024-10-01 NOTE — PROGRESS NOTES
"1. Primary osteoarthritis of one hip, right          Orders Placed This Encounter   Procedures    Large joint arthrocentesis      Impression:  Patient is here in follow up of right hip osteoarthritis.  The patient is a good candidate for repeat ultrasound-guided right hip joint steroid/anesthetic injection (last 10/2023).  Please see procedure note below.  Patient tolerated the procedure and had immediate relief of symptoms.  RTC in 3 months or after.     Imaging Studies (I personally reviewed images in PACS and report):  Right hip x-rays most recent to this encounter reviewed.  These images show mild-moderate osteoarthritis of the right hip joint as evidence by subchondral sclerosis, acetabular osteophyte and offset of femoral head and neck junction.    No follow-ups on file.    Patient is in agreement with the above plan.    HPI:  Yehuda Trejo is a 64 y.o. male  who presents in follow up.  Here for   Chief Complaint   Patient presents with    Right Hip - Pain, Follow-up, Injections       Since last visit: See above.    Following history reviewed and updated:  Past Medical History:   Diagnosis Date    Blood type A+     Other male erectile dysfunction 11/27/2018    Right ankle injury 11/04/2022     Past Surgical History:   Procedure Laterality Date    HERNIA REPAIR Left     twice repaired in past    TONSILLECTOMY       Social History   Social History     Substance and Sexual Activity   Alcohol Use Yes    Comment: occasional     Social History     Substance and Sexual Activity   Drug Use No     Social History     Tobacco Use   Smoking Status Never   Smokeless Tobacco Never     Family History   Problem Relation Age of Onset    Stroke Father 78    Diabetes Father 68    Alzheimer's disease Father     Heart attack Father         dx'd in 70s    Alcohol abuse Neg Hx     Substance Abuse Neg Hx     Mental illness Neg Hx     Depression Neg Hx      No Known Allergies     Constitutional:  /79   Pulse 81   Ht 5' 9\" " (1.753 m)   Wt 105 kg (232 lb)   BMI 34.26 kg/m²    General: NAD.  Eyes: Clear sclerae.  ENT: No inflammation, lesion, or mass of lips.  No tracheal deviation.  Musculoskeletal: As mentioned below.  Integumentary: No visible rashes or skin lesions.  Pulmonary/Chest: Effort normal. No respiratory distress.   Neuro: CN's grossly intact, PALMA.  Psych: Normal affect and judgement.  Vascular: WWP.    Right Hip Exam     Range of Motion   Internal rotation:  abnormal     Other   Erythema: absent  Scars: absent  Sensation: normal  Pulse: present             Large joint arthrocentesis: R hip joint  Universal Protocol:  Procedure performed by:  Consent: Verbal consent obtained. Written consent not obtained.  Consent given by: patient  Timeout called at: 10/1/2024 8:07 AM.  Patient understanding: patient states understanding of the procedure being performed  Site marked: the operative site was marked  Radiology Images displayed and confirmed. If images not available, report reviewed: imaging studies available  Patient identity confirmed: verbally with patient  Supporting Documentation  Indications: pain and diagnostic evaluation   Procedure Details  Location: hip - R hip joint  Ultrasound guidance: yes (US guidance was used to find the area of interest.)  Medications administered: 80 mg triamcinolone acetonide 40 mg/mL; 10 mL ropivacaine 0.5 %    Patient tolerance: patient tolerated the procedure well with no immediate complications  Dressing:  Sterile dressing applied    The right hip joint was visualized with ultrasound and injected with steroid/anesthetic solution as indicated.    Prior to the injection, the ultrasound was used to evaluate for any neural or vascular structures.  Care was taken to avoid these structures.    The images (and video if taken) were saved to the [employer] ultrasound system.    Risks of this procedure include:    - Risk of bleeding since a needle is involved.  - Risk of infection (1/10,000 chance as per  recent studies).  Signs/symptoms were discussed and they would prompt an urgent evaluation at an emergency department.  - Risk of pigmentation or skin dimpling in the skin (2-3% chance as per recent studies) from the steroid.  - Risk of increased pain from steroid flare (1% chance as per recent studies) that typically lasts 24-48 hours.  - Risk of increased blood sugars from the steroid medication that can last for a few weeks.  If the patient is a diabetic or pre-diabetic, they were encouraged to closely monitor their blood sugars and discuss with PCP if elevated more than usual or if having symptoms.    We decided that the benefits outweigh the risks and so we proceeded with the procedure.

## 2024-10-01 NOTE — ASSESSMENT & PLAN NOTE
BP have improved   Continue amlodipine 5 mg twice daily and lisinopril 5 mg daily  Recommend home blood pressure monitoring     Orders:    lisinopril (ZESTRIL) 5 mg tablet; Take 1 tablet (5 mg total) by mouth daily

## 2024-10-09 ENCOUNTER — OFFICE VISIT (OUTPATIENT)
Dept: NEUROLOGY | Facility: CLINIC | Age: 64
End: 2024-10-09
Payer: COMMERCIAL

## 2024-10-09 VITALS
HEART RATE: 68 BPM | WEIGHT: 232.6 LBS | OXYGEN SATURATION: 95 % | TEMPERATURE: 98.1 F | SYSTOLIC BLOOD PRESSURE: 122 MMHG | BODY MASS INDEX: 34.35 KG/M2 | DIASTOLIC BLOOD PRESSURE: 76 MMHG

## 2024-10-09 DIAGNOSIS — R29.90 STROKE-LIKE SYMPTOMS: ICD-10-CM

## 2024-10-09 DIAGNOSIS — G45.9 TIA (TRANSIENT ISCHEMIC ATTACK): ICD-10-CM

## 2024-10-09 DIAGNOSIS — I10 PRIMARY HYPERTENSION: Primary | ICD-10-CM

## 2024-10-09 PROCEDURE — 99214 OFFICE O/P EST MOD 30 MIN: CPT | Performed by: PHYSICIAN ASSISTANT

## 2024-10-09 RX ORDER — ATORVASTATIN CALCIUM 40 MG/1
40 TABLET, FILM COATED ORAL EVERY EVENING
Qty: 30 TABLET | Refills: 5 | Status: CANCELLED | OUTPATIENT
Start: 2024-10-09

## 2024-10-09 NOTE — PATIENT INSTRUCTIONS
The event you had may have been a TIA.  Other possible causes of this could have been hypertensive encephalopathy (event caused by very high blood pressure), migraine (migraines can cause other neurologic symptoms such as visual disturbances, speech difficulty), or less likely, seizure.  You are scheduled for a routine EEG to assess the electrical activity in the brain.  Please have this done.    Because we cannot rule out a TIA, we treat as such.  For ongoing stroke prevention, continue aspirin 81mg daily   We can hold off on statin for now.  Would continue to monitor cholesterol through PCP.   If LDL remains above 90, then would suggest adding a statin  Continue to follow with your PCP for management of blood pressure (goal BP <130/80 routinely), cholesterol, and blood sugar  Recommend heart healthy diet and routine exercise  Follow up in 6 months.  If doing well at that time, we can just follow up as-needed    If you experience any facial droop, weakness on one side of the body, speech or swallowing difficulty, painless loss of vision in one eye, double vision, vertigo that does not resolve quickly/imbalance, go to the ER/call 911.

## 2024-10-09 NOTE — PROGRESS NOTES
Patient ID: Yehuda Trejo is a 64 y.o. male.    Assessment/Plan:    Stroke-like symptoms  Patient had an episode of expressive aphasia on 9/4/2024.  Earlier in the day he had a brief visual disturbance, which she had experienced a few times within the last year (described as seeing a barcode pattern).  CTA was unremarkable, MRI brain negative for stroke.  A1c 5.5, LDL 93.  Echo unremarkable.  Possible etiologies of this episode was TIA, hypertensive encephalopathy, migraine, and less likely seizure.  Due to TIA could not be ruled out, he was given 3 weeks of DAPT and reduced to ASA 81mg daily monotherapy along with Lipitor.  Outpatient EEG ordered.    He has not had any new neurologic symptoms to indicate recurrent TIA/CVA.    Reviewed several differentials with patient including TIA, hypertensive in several, complicated migraine, less likely seizure.  I do suggest he obtain the routine EEG later this month as scheduled.  Would suggest treating as a TIA.      He has stopped the statin due to he was only given 30 days in the hospital and this was not refilled by his PCP.  He does not wish to restart the statin.  I suggested close monitoring of his cholesterol with his PCP.  If LDL is consistently over 90, then I would suggest starting a statin.  Ideally, LDL should be less than 70.  Goal blood pressure less than 130/80 routinely.    Plan:  - Continue aspirin 81 mg daily  - Patient declines statin.  He can continue to follow cholesterol panel with PCP and would ideally suggest LDL be less than 70.  If consistently above 90, would highly suggest initiating statin  - Will defer management of blood pressure, lipids and blood sugar with PCP  - Heart healthy diet and routine exercise as tolerated were advised  - Obtain routine EEG as scheduled later this month  - Follow-up in 6 months.  If doing well at that time, we can follow-up as needed    Signs and symptoms of stroke were discussed and included in the AVS today.        "Diagnoses and all orders for this visit:    Primary hypertension    TIA (transient ischemic attack)    Stroke-like symptoms           Subjective:    HPI    Yehuda Trejo is a 64 y.o. right handed male with PMH of RA, prior tobacco use, who presents today for a hospital follow up.    Patient presented to the ED on 9/5/24 with difficulty expressing himself and visual disturbances. 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  He reported this event lasted about 2 to 4 minutes before resolving on his own. On the same day, a few hours prior, he noted that he experienced some visual disturbances described as \" black and white barcodes and a wavy pattern\" lasting approximately 20 seconds.  He was able to continue his day at work but decided to get evaluated the next day.  Patient endorsed that he had similar events about a month ago with changes in his speech, which only lasted a few seconds. BP in the /88.  CTA h/n with no LVO or flow limiting stenosis.  He was admitted and seen by neurology.  A1C 5.3, lipid panel , LDL 96.  MRI brain negative for acute infarction. ECHO with EF 60%, no atrial dilation.  Per inpatient neurology, TIA could not be ruled out, therefore was started on DAPT x 3 weeks then ASA/statin monotherapy. Hypertensive encephalopathy also in the differential along with complex migraine given the positive visual disturbance and similar prior spell. Seizure is felt less likely overall but routine EEG as outpatient was advised.    Today, patient reports he is overall doing well.  He denies any new neurologic symptoms since his hospitalization.  We reviewed the events on 9/4.  He said that earlier that morning he had an episode where his vision was abnormal and it appeared he was \"seeing a barcode\".  He says this happened about 3-4 times over the last year.  In the past, it would only be there " "for a few seconds and he would take his glasses off and sit down and rest, and the visual disturbance would resolve.  There have not been any other associated symptoms with this symptom.  The same thing happened the morning of 9/4 and he did not think anything of it.  Later that day while he was at work speaking with a customer he had classic expressive aphasia where he knew what he wanted to say in his head and it was coming out \"like garbly-gook\".  He was frustrated because he could not get his words out.  This lasted a few minutes and then resolved.  He decided not to go to the hospital at that time and to \"research\" his symptoms and then decided to go in the next day to be evaluated due to his father's history of stroke.  He has been taking aspirin 81 mg daily regularly and took the atorvastatin for 30 days, but the prescription ran out and he has no longer taking it.  This was not refilled by his PCP.  He says he has read negative things about statins on the Internet and does not wish to continue the medication.  He has been walking and has changed his diet significantly.       The following portions of the patient's history were reviewed and updated as appropriate: current medications, past family history, past medical history, past social history, past surgical history, and problem list.       Objective:    Blood pressure 122/76, pulse 68, temperature 98.1 °F (36.7 °C), temperature source Temporal, weight 106 kg (232 lb 9.6 oz), SpO2 95%.    Physical Exam  Constitutional:       Appearance: Normal appearance.   Eyes:      Extraocular Movements: EOM normal.      Pupils: Pupils are equal, round, and reactive to light.   Neurological:      Mental Status: He is alert.      Motor: Motor strength is normal.     Deep Tendon Reflexes: Reflexes are normal and symmetric.   Psychiatric:         Mood and Affect: Mood normal.         Speech: Speech normal.         Behavior: Behavior normal.         Neurological " Exam  Mental Status  Alert. Oriented to person, place, time and situation. Speech is normal. Language is fluent with no aphasia. Attention and concentration are normal.    Cranial Nerves  CN II: Visual fields full to confrontation.  CN III, IV, VI: Extraocular movements intact bilaterally. Pupils equal round and reactive to light bilaterally.  CN V: Facial sensation is normal.  CN VII: Full and symmetric facial movement.  CN VIII: Hearing is normal.  CN IX, X: Palate elevates symmetrically  CN XI: Shoulder shrug strength is normal.  CN XII: Tongue midline without atrophy or fasciculations.    Motor   Normal muscle tone. Strength is 5/5 throughout all four extremities.    Sensory  Light touch is normal in upper and lower extremities.     Reflexes  Deep tendon reflexes are 2+ and symmetric in all four extremities.    Coordination  Right: Finger-to-nose normal.Left: Finger-to-nose normal.    Gait  Casual gait is normal including stance, stride, and arm swing.        ROS:    Review of Systems   Constitutional:  Negative for appetite change, fatigue and fever.   HENT: Negative.  Negative for hearing loss, tinnitus, trouble swallowing and voice change.    Eyes:  Negative for photophobia, pain and visual disturbance.   Respiratory: Negative.  Negative for shortness of breath.    Cardiovascular: Negative.  Negative for palpitations.   Gastrointestinal: Negative.  Negative for nausea and vomiting.   Endocrine: Negative.  Negative for cold intolerance.   Genitourinary: Negative.  Negative for dysuria, frequency and urgency.   Musculoskeletal:  Negative for back pain, gait problem, myalgias, neck pain and neck stiffness.   Skin: Negative.  Negative for rash.   Allergic/Immunologic: Negative.    Neurological:  Negative for dizziness, tremors, seizures, syncope, facial asymmetry, speech difficulty, weakness, light-headedness, numbness and headaches.        No symptoms   Hematological: Negative.  Does not bruise/bleed easily.    Psychiatric/Behavioral: Negative.  Negative for confusion, hallucinations and sleep disturbance.      I personally reviewed and updated the ROS as appropriate

## 2024-10-17 ENCOUNTER — LAB (OUTPATIENT)
Dept: LAB | Facility: CLINIC | Age: 64
End: 2024-10-17
Payer: COMMERCIAL

## 2024-10-17 DIAGNOSIS — Z12.5 SCREENING FOR PROSTATE CANCER: ICD-10-CM

## 2024-10-17 DIAGNOSIS — E87.1 HYPONATREMIA: ICD-10-CM

## 2024-10-17 LAB
ANION GAP SERPL CALCULATED.3IONS-SCNC: 4 MMOL/L (ref 4–13)
BUN SERPL-MCNC: 11 MG/DL (ref 5–25)
CALCIUM SERPL-MCNC: 9 MG/DL (ref 8.4–10.2)
CHLORIDE SERPL-SCNC: 102 MMOL/L (ref 96–108)
CO2 SERPL-SCNC: 29 MMOL/L (ref 21–32)
CREAT SERPL-MCNC: 0.92 MG/DL (ref 0.6–1.3)
GFR SERPL CREATININE-BSD FRML MDRD: 87 ML/MIN/1.73SQ M
GLUCOSE P FAST SERPL-MCNC: 103 MG/DL (ref 65–99)
POTASSIUM SERPL-SCNC: 4 MMOL/L (ref 3.5–5.3)
PSA SERPL-MCNC: 0.87 NG/ML (ref 0–4)
SODIUM SERPL-SCNC: 135 MMOL/L (ref 135–147)

## 2024-10-17 PROCEDURE — 36415 COLL VENOUS BLD VENIPUNCTURE: CPT

## 2024-10-17 PROCEDURE — 80048 BASIC METABOLIC PNL TOTAL CA: CPT

## 2024-10-17 PROCEDURE — G0103 PSA SCREENING: HCPCS

## 2024-10-28 ENCOUNTER — HOSPITAL ENCOUNTER (OUTPATIENT)
Dept: NEUROLOGY | Facility: CLINIC | Age: 64
Discharge: HOME | End: 2024-10-28
Payer: COMMERCIAL

## 2024-10-28 DIAGNOSIS — R29.90 STROKE-LIKE SYMPTOMS: ICD-10-CM

## 2024-10-28 PROCEDURE — 95816 EEG AWAKE AND DROWSY: CPT

## 2024-10-28 PROCEDURE — 95816 EEG AWAKE AND DROWSY: CPT | Performed by: STUDENT IN AN ORGANIZED HEALTH CARE EDUCATION/TRAINING PROGRAM

## 2024-11-15 ENCOUNTER — CONSULT (OUTPATIENT)
Age: 64
End: 2024-11-15
Payer: COMMERCIAL

## 2024-11-15 VITALS — DIASTOLIC BLOOD PRESSURE: 68 MMHG | SYSTOLIC BLOOD PRESSURE: 128 MMHG | WEIGHT: 234.4 LBS | BODY MASS INDEX: 34.61 KG/M2

## 2024-11-15 DIAGNOSIS — Z12.11 ENCOUNTER FOR SCREENING COLONOSCOPY: Primary | ICD-10-CM

## 2024-11-15 DIAGNOSIS — Z12.11 SCREENING FOR COLON CANCER: ICD-10-CM

## 2024-11-15 PROCEDURE — 99243 OFF/OP CNSLTJ NEW/EST LOW 30: CPT | Performed by: COLON & RECTAL SURGERY

## 2024-11-15 NOTE — ASSESSMENT & PLAN NOTE
Patient presents for evaluation for colorectal cancer screening.  Patient has had no screening in the past.  Patient has no symptoms at present.  Approximately 2 months ago, he presented for neurologic event of aphasia.  His diagnosis at that point time is unclear.  He was placed on dual antiplatelet therapy for 3 weeks.  He is currently on aspirin.    I do believe it is reasonable to offer him colonoscopy for colorectal cancer screening.  He will be at no significantly increased risk from a medication point of view is that he is off dual antiplatelet therapy.  Question only would be a matter of timing.  I will reach out to neurology to see if the delay is recommended prior to colonoscopy as anesthesia is administered.  After we receive this clearance or timeframe, we will schedule for colonoscopy.  Risks and benefits of colonoscopy reviewed with patient to include, but not be limited to: anesthesia, bleeding, missed lesion and perforation requiring surgery.  Patient consents to procedure.

## 2024-11-15 NOTE — LETTER
November 15, 2024     BEATA Palacio  [address]  [address]  [place] [address]    Patient: Yehuda Trejo   YOB: 1960   Date of Visit: 11/15/2024       Dear Dr. Hickey:    Thank you for referring Yehuda Trejo to me for evaluation. Below are my notes for this consultation.    If you have questions, please do not hesitate to call me. I look forward to following your patient along with you.         Sincerely,        Jose G Bird MD        CC: REED Corbett MD  11/15/2024  1:38 PM  Incomplete  Yehuda was seen today for colon cancer screening.    Diagnoses and all orders for this visit:    Encounter for screening colonoscopy    Screening for colon cancer  -     Ambulatory Referral to Colorectal Surgery       Encounter for screening colonoscopy  Patient presents for evaluation for colorectal cancer screening.  Patient has had no screening in the past.  Patient has no symptoms at present.  Approximately 2 months ago, he presented for neurologic event of aphasia.  His diagnosis at that point time is unclear.  He was placed on dual antiplatelet therapy for 3 weeks.  He is currently on aspirin.    I do believe it is reasonable to offer him colonoscopy for colorectal cancer screening.  He will be at no significantly increased risk from a medication point of view is that he is off dual antiplatelet therapy.  Question only would be a matter of timing.  I will reach out to neurology to see if the delay is recommended prior to colonoscopy as anesthesia is administered.  After we receive this clearance or timeframe, we will schedule for colonoscopy.  Risks and benefits of colonoscopy reviewed with patient to include, but not be limited to: anesthesia, bleeding, missed lesion and perforation requiring surgery.  Patient consents to procedure.      HPI    Yehuda Trejo is a 64 y.o. male who presents for a colonoscopy consultation.     Lab Results   Component Value  Date    WBC 9.48 09/05/2024    HGB 15.3 09/05/2024    HCT 46.1 09/05/2024    MCV 91 09/05/2024     09/05/2024     Lab Results   Component Value Date    SODIUM 135 10/17/2024    K 4.0 10/17/2024     10/17/2024    CO2 29 10/17/2024    AGAP 4 10/17/2024    BUN 11 10/17/2024    CREATININE 0.92 10/17/2024    GLUF 103 (H) 10/17/2024    CALCIUM 9.0 10/17/2024    AST 23 09/05/2024    ALT 19 09/05/2024    ALKPHOS 34 09/05/2024    TP 7.9 09/05/2024    TBILI 0.46 09/05/2024    EGFR 87 10/17/2024     Past Medical History:   Diagnosis Date   • Blood type A+    • Other male erectile dysfunction 11/27/2018   • Right ankle injury 11/04/2022     Past Surgical History:   Procedure Laterality Date   • HERNIA REPAIR Left     twice repaired in past   • TONSILLECTOMY         Current Outpatient Medications:   •  amLODIPine (NORVASC) 10 mg tablet, Take 1 tablet (10 mg total) by mouth daily, Disp: 90 tablet, Rfl: 0  •  lisinopril (ZESTRIL) 5 mg tablet, Take 1 tablet (5 mg total) by mouth daily, Disp: 90 tablet, Rfl: 1  •  sildenafil (VIAGRA) 50 MG tablet, Take 1 tablet (50 mg total) by mouth daily as needed for erectile dysfunction, Disp: 10 tablet, Rfl: 1  •  aspirin 81 mg chewable tablet, Chew 1 tablet (81 mg total) daily, Disp: 30 tablet, Rfl: 0  Allergies as of 11/15/2024   • (No Known Allergies)     Review of Systems   All other systems reviewed and are negative.    Vitals:    11/15/24 1302   BP: 128/68     Physical Exam  Constitutional:       Appearance: Normal appearance.   HENT:      Head: Normocephalic and atraumatic.   Eyes:      Extraocular Movements: Extraocular movements intact.      Pupils: Pupils are equal, round, and reactive to light.   Musculoskeletal:         General: Normal range of motion.   Skin:     General: Skin is warm and dry.   Neurological:      General: No focal deficit present.      Mental Status: He is alert and oriented to person, place, and time.   Psychiatric:         Mood and Affect: Mood  normal.         Behavior: Behavior normal.         Thought Content: Thought content normal.         Judgment: Judgment normal.

## 2024-11-15 NOTE — PROGRESS NOTES
Yehuda was seen today for colon cancer screening.    Diagnoses and all orders for this visit:    Encounter for screening colonoscopy    Screening for colon cancer  -     Ambulatory Referral to Colorectal Surgery       Encounter for screening colonoscopy  Patient presents for evaluation for colorectal cancer screening.  Patient has had no screening in the past.  Patient has no symptoms at present.  Approximately 2 months ago, he presented for neurologic event of aphasia.  His diagnosis at that point time is unclear.  He was placed on dual antiplatelet therapy for 3 weeks.  He is currently on aspirin.    I do believe it is reasonable to offer him colonoscopy for colorectal cancer screening.  He will be at no significantly increased risk from a medication point of view is that he is off dual antiplatelet therapy.  Question only would be a matter of timing.  I will reach out to neurology to see if the delay is recommended prior to colonoscopy as anesthesia is administered.  After we receive this clearance or timeframe, we will schedule for colonoscopy.  Risks and benefits of colonoscopy reviewed with patient to include, but not be limited to: anesthesia, bleeding, missed lesion and perforation requiring surgery.  Patient consents to procedure.      HPI    Yehuda Trejo is a 64 y.o. male who presents for a colonoscopy consultation.     Lab Results   Component Value Date    WBC 9.48 09/05/2024    HGB 15.3 09/05/2024    HCT 46.1 09/05/2024    MCV 91 09/05/2024     09/05/2024     Lab Results   Component Value Date    SODIUM 135 10/17/2024    K 4.0 10/17/2024     10/17/2024    CO2 29 10/17/2024    AGAP 4 10/17/2024    BUN 11 10/17/2024    CREATININE 0.92 10/17/2024    GLUF 103 (H) 10/17/2024    CALCIUM 9.0 10/17/2024    AST 23 09/05/2024    ALT 19 09/05/2024    ALKPHOS 34 09/05/2024    TP 7.9 09/05/2024    TBILI 0.46 09/05/2024    EGFR 87 10/17/2024     Past Medical History:   Diagnosis Date    Blood type A+      Other male erectile dysfunction 11/27/2018    Right ankle injury 11/04/2022     Past Surgical History:   Procedure Laterality Date    HERNIA REPAIR Left     twice repaired in past    TONSILLECTOMY         Current Outpatient Medications:     amLODIPine (NORVASC) 10 mg tablet, Take 1 tablet (10 mg total) by mouth daily, Disp: 90 tablet, Rfl: 0    lisinopril (ZESTRIL) 5 mg tablet, Take 1 tablet (5 mg total) by mouth daily, Disp: 90 tablet, Rfl: 1    sildenafil (VIAGRA) 50 MG tablet, Take 1 tablet (50 mg total) by mouth daily as needed for erectile dysfunction, Disp: 10 tablet, Rfl: 1    aspirin 81 mg chewable tablet, Chew 1 tablet (81 mg total) daily, Disp: 30 tablet, Rfl: 0  Allergies as of 11/15/2024    (No Known Allergies)     Review of Systems   All other systems reviewed and are negative.    Vitals:    11/15/24 1302   BP: 128/68     Physical Exam  Constitutional:       Appearance: Normal appearance.   HENT:      Head: Normocephalic and atraumatic.   Eyes:      Extraocular Movements: Extraocular movements intact.      Pupils: Pupils are equal, round, and reactive to light.   Musculoskeletal:         General: Normal range of motion.   Skin:     General: Skin is warm and dry.   Neurological:      General: No focal deficit present.      Mental Status: He is alert and oriented to person, place, and time.   Psychiatric:         Mood and Affect: Mood normal.         Behavior: Behavior normal.         Thought Content: Thought content normal.         Judgment: Judgment normal.

## 2024-11-18 ENCOUNTER — TELEPHONE (OUTPATIENT)
Age: 64
End: 2024-11-18

## 2024-11-18 NOTE — TELEPHONE ENCOUNTER
----- Message from Jose G Bird MD sent at 11/15/2024  1:39 PM EST -----  Please schedule colonoscopy after neurology has cleared patient.

## 2024-12-08 DIAGNOSIS — G45.9 TIA (TRANSIENT ISCHEMIC ATTACK): ICD-10-CM

## 2024-12-08 DIAGNOSIS — R29.90 STROKE-LIKE SYMPTOMS: ICD-10-CM

## 2024-12-09 RX ORDER — AMLODIPINE BESYLATE 10 MG/1
10 TABLET ORAL DAILY
Qty: 90 TABLET | Refills: 1 | Status: SHIPPED | OUTPATIENT
Start: 2024-12-09

## 2025-01-02 ENCOUNTER — OFFICE VISIT (OUTPATIENT)
Dept: INTERNAL MEDICINE CLINIC | Facility: CLINIC | Age: 65
End: 2025-01-02
Payer: COMMERCIAL

## 2025-01-02 VITALS
OXYGEN SATURATION: 98 % | HEART RATE: 74 BPM | WEIGHT: 232 LBS | BODY MASS INDEX: 34.36 KG/M2 | HEIGHT: 69 IN | SYSTOLIC BLOOD PRESSURE: 116 MMHG | DIASTOLIC BLOOD PRESSURE: 74 MMHG | TEMPERATURE: 96.8 F

## 2025-01-02 DIAGNOSIS — Z00.00 ANNUAL PHYSICAL EXAM: Primary | ICD-10-CM

## 2025-01-02 DIAGNOSIS — E87.1 HYPONATREMIA: ICD-10-CM

## 2025-01-02 DIAGNOSIS — R29.90 STROKE-LIKE SYMPTOMS: ICD-10-CM

## 2025-01-02 DIAGNOSIS — I10 PRIMARY HYPERTENSION: ICD-10-CM

## 2025-01-02 PROCEDURE — 99396 PREV VISIT EST AGE 40-64: CPT | Performed by: NURSE PRACTITIONER

## 2025-01-02 PROCEDURE — 99214 OFFICE O/P EST MOD 30 MIN: CPT | Performed by: NURSE PRACTITIONER

## 2025-01-02 NOTE — ASSESSMENT & PLAN NOTE
On low dose asa daily.   Not on a statin, check lipids.   EEG normal.  Sees neurology.   Orders:    Comprehensive metabolic panel; Future    Lipid Panel with Direct LDL reflex; Future    TSH, 3rd generation with Free T4 reflex; Future

## 2025-01-02 NOTE — PROGRESS NOTES
Adult Annual Physical  Name: Yehuda Trejo      : 1960      MRN: [id number]  Encounter Provider: BEATA Palacio  Encounter Date: 2025   Encounter department: [facility] INTER[facility]    Assessment & Plan  Annual physical exam         Primary hypertension  Stable.   On amlodipine and lisinopril.        Stroke-like symptoms  On low dose asa daily.   Not on a statin, check lipids.   EEG normal.  Sees neurology.   Orders:    Comprehensive metabolic panel; Future    Lipid Panel with Direct LDL reflex; Future    TSH, 3rd generation with Free T4 reflex; Future    Hyponatremia  Sodium now normal.        Immunizations and preventive care screenings were discussed with patient today. Appropriate education was printed on patient's after visit summary.      Counseling:  Dental Health: discussed importance of regular tooth brushing, flossing, and dental visits.  Exercise: the importance of regular exercise/physical activity was discussed. Recommend exercise 3-5 times per week for at least 30 minutes.          History of Present Illness     Adult Annual Physical:  Patient presents for annual physical. He had facial swelling earlier this week around his cheeks. No difficulty breathing or swallowing. He took benadryl with relief. No new foods.   He has had nasal congestion and cough for about 2 weeks. Cough is improving, not productive.   .     Diet and Physical Activity:  - Diet/Nutrition: well balanced diet.  - Exercise: no formal exercise.    General Health:  - Sleep: sleeps well.  - Hearing: normal hearing right ear and normal hearing left ear.  - Vision: wears glasses and goes for regular eye exams.  - Dental: regular dental visits.     Health:    - Urinary symptoms: none.     Review of Systems   Constitutional:  Negative for activity change, appetite change, fatigue and unexpected weight change.   HENT:  Positive for congestion. Negative for ear pain, facial swelling and sore throat.   "  Eyes:  Negative for visual disturbance.   Respiratory:  Positive for cough. Negative for shortness of breath.    Cardiovascular:  Negative for chest pain, palpitations and leg swelling.   Gastrointestinal:  Negative for abdominal pain, constipation and diarrhea.   Genitourinary:  Negative for difficulty urinating.   Musculoskeletal:  Negative for arthralgias.   Neurological:  Negative for dizziness, weakness, light-headedness and headaches.   Psychiatric/Behavioral:  Negative for sleep disturbance.          Objective   /74   Pulse 74   Temp (!) 96.8 °F (36 °C)   Ht 5' 9\" (1.753 m)   Wt 105 kg (232 lb)   SpO2 98%   BMI 34.26 kg/m²     Physical Exam  Vitals reviewed.   Constitutional:       Appearance: Normal appearance. He is well-developed.   HENT:      Head: Normocephalic and atraumatic.      Right Ear: Tympanic membrane, ear canal and external ear normal.      Left Ear: Tympanic membrane, ear canal and external ear normal.      Mouth/Throat:      Mouth: Mucous membranes are moist.      Pharynx: No posterior oropharyngeal erythema.   Eyes:      Conjunctiva/sclera: Conjunctivae normal.   Cardiovascular:      Rate and Rhythm: Normal rate and regular rhythm.      Heart sounds: Normal heart sounds.   Pulmonary:      Effort: Pulmonary effort is normal.      Breath sounds: Normal breath sounds.   Abdominal:      General: Bowel sounds are normal.      Palpations: Abdomen is soft.   Musculoskeletal:         General: Normal range of motion.      Cervical back: Neck supple.      Right lower leg: No edema.      Left lower leg: No edema.   Lymphadenopathy:      Head:      Right side of head: No preauricular or posterior auricular adenopathy.      Left side of head: No preauricular or posterior auricular adenopathy.      Cervical: No cervical adenopathy.   Skin:     General: Skin is warm and dry.   Neurological:      Mental Status: He is alert and oriented to person, place, and time.   Psychiatric:         Mood " and Affect: Mood normal.         Behavior: Behavior normal.

## 2025-01-04 ENCOUNTER — HOSPITAL ENCOUNTER (EMERGENCY)
Facility: HOSPITAL | Age: 65
Discharge: HOME | End: 2025-01-05
Attending: EMERGENCY MEDICINE | Admitting: EMERGENCY MEDICINE
Payer: COMMERCIAL

## 2025-01-04 VITALS
HEART RATE: 88 BPM | HEIGHT: 69 IN | RESPIRATION RATE: 18 BRPM | TEMPERATURE: 98.2 F | WEIGHT: 231.48 LBS | DIASTOLIC BLOOD PRESSURE: 71 MMHG | BODY MASS INDEX: 34.29 KG/M2 | OXYGEN SATURATION: 98 % | SYSTOLIC BLOOD PRESSURE: 163 MMHG

## 2025-01-04 DIAGNOSIS — M54.50 LOWER BACK PAIN: Primary | ICD-10-CM

## 2025-01-04 PROCEDURE — 96372 THER/PROPH/DIAG INJ SC/IM: CPT

## 2025-01-04 PROCEDURE — 99283 EMERGENCY DEPT VISIT LOW MDM: CPT

## 2025-01-04 PROCEDURE — 99284 EMERGENCY DEPT VISIT MOD MDM: CPT | Performed by: EMERGENCY MEDICINE

## 2025-01-04 RX ORDER — DIAZEPAM 5 MG/1
5 TABLET ORAL ONCE
Status: COMPLETED | OUTPATIENT
Start: 2025-01-04 | End: 2025-01-04

## 2025-01-04 RX ORDER — KETOROLAC TROMETHAMINE 30 MG/ML
15 INJECTION, SOLUTION INTRAMUSCULAR; INTRAVENOUS ONCE
Status: COMPLETED | OUTPATIENT
Start: 2025-01-04 | End: 2025-01-04

## 2025-01-04 RX ORDER — OXYCODONE HYDROCHLORIDE 5 MG/1
5 TABLET ORAL ONCE
Refills: 0 | Status: COMPLETED | OUTPATIENT
Start: 2025-01-04 | End: 2025-01-04

## 2025-01-04 RX ORDER — LIDOCAINE 50 MG/G
1 PATCH TOPICAL ONCE
Status: DISCONTINUED | OUTPATIENT
Start: 2025-01-04 | End: 2025-01-05 | Stop reason: HOSPADM

## 2025-01-04 RX ORDER — CYCLOBENZAPRINE HCL 10 MG
10 TABLET ORAL 3 TIMES DAILY PRN
COMMUNITY
Start: 2025-01-04

## 2025-01-04 RX ADMIN — DIAZEPAM 5 MG: 5 TABLET ORAL at 23:46

## 2025-01-04 RX ADMIN — KETOROLAC TROMETHAMINE 15 MG: 30 INJECTION, SOLUTION INTRAMUSCULAR; INTRAVENOUS at 23:46

## 2025-01-04 RX ADMIN — LIDOCAINE 1 PATCH: 50 PATCH CUTANEOUS at 23:46

## 2025-01-04 RX ADMIN — OXYCODONE HYDROCHLORIDE 5 MG: 5 TABLET ORAL at 23:46

## 2025-01-05 ENCOUNTER — APPOINTMENT (EMERGENCY)
Dept: RADIOLOGY | Facility: HOSPITAL | Age: 65
End: 2025-01-05
Payer: COMMERCIAL

## 2025-01-05 PROCEDURE — 72100 X-RAY EXAM L-S SPINE 2/3 VWS: CPT

## 2025-01-05 RX ORDER — ACETAMINOPHEN 500 MG
1000 TABLET ORAL EVERY 6 HOURS PRN
Qty: 14 TABLET | Refills: 0 | Status: SHIPPED | OUTPATIENT
Start: 2025-01-05 | End: 2025-01-12

## 2025-01-05 RX ORDER — OXYCODONE HYDROCHLORIDE 5 MG/1
5 TABLET ORAL EVERY 6 HOURS PRN
Qty: 8 TABLET | Refills: 0 | Status: SHIPPED | OUTPATIENT
Start: 2025-01-05 | End: 2025-01-08

## 2025-01-05 RX ORDER — LIDOCAINE 50 MG/G
1 PATCH TOPICAL EVERY 24 HOURS
Qty: 15 PATCH | Refills: 0 | Status: SHIPPED | OUTPATIENT
Start: 2025-01-05

## 2025-01-05 NOTE — ED PROVIDER NOTES
"Time reflects when diagnosis was documented in both MDM as applicable and the Disposition within this note       Time User Action Codes Description Comment    1/5/2025 12:33 AM Averyjakobdanielsalinas Isabella Jameson [M54.50] Lower back pain           ED Disposition       ED Disposition   Discharge    Condition   Stable    Date/Time   Sun Jan 5, 2025 12:37 AM    Comment   Yehuda Trejo discharge to home/self care.                   Assessment & Plan       Medical Decision Making  Patient with history as below presented to triage with CC of \" Patient presents with:  Back Pain: Patient reports severe right sided back pain from mid right back radiating down to right hip and right leg. Seen at urgent care and prescribed muscle relaxant (Flexeril) - took dose @ 2030 with no relief to pain. Reports pain severe that he is barely able to ambulate. (-) injury/trauma   \"  Hx obtained from pt and wife. Exam as below.    64-year-old male presenting to the ED with nontraumatic right-sided low back pain that radiates down his leg.  Differential diagnoses includes lumbago versus musculoskeletal spasm / strain versus sciatica. No back pain red flags on history or physical. Presentation not consistent with malignancy (lack of history of malignancy, lack of B symptoms), fracture (no trauma, no bony tenderness to palpation), cauda equina (no bowel or urinary incontinence/retention, no saddle anesthesia, no distal weakness), AAA, viscus perforation , pulmonary embolism, renal colic, pyelonephritis (afebrile, no CVAT, no urinary symptoms). Given his age, and new onset of back pain will evaluate with plain films of the lumbar region.     Plan: pain control, supportive care, reassess, x-ray, likely outpatient referral to comprehensive [facility]         I have independently ordered, reviewed and interpreted the following: labs and/or imaging studies and or EKG listed below.  Reviewed external records including notes, and prior labs/imaging results.    Disposition: " Patient stable for outpatient management. Discussed need for follow up with their primary doctor or specialist to review all results, including incidental findings as below. Patient discharged with explanation of ED workup and diagnosis, instructions on how to obtain outpatient follow up, care instructions at home, and strict return precautions if patient develops new or worsening symptoms. Patients questions answered and agreeable with discharge plan.     See ED Course for further MDM.      PLEASE NOTE:  This encounter was completed utilizing the [employer] Direct Speech Voice Recognition Software. Grammatical errors, random word insertions, pronoun errors and incomplete sentences are occasional inherent consequences of the system due to software limitations, ambient noise and hardware issues.These may be missed by proof reading prior to affixing electronic signature. Any questions or concerns about the content, text or information contained within the body of this dictation should be directly addressed to the physician for clarification. Please do not hesitate to call me directly if you have any questions or concerns.      Amount and/or Complexity of Data Reviewed  Independent Historian: spouse  Radiology: ordered and independent interpretation performed. Decision-making details documented in ED Course.    Risk  OTC drugs.  Prescription drug management.        ED Course as of 01/05/25 0100   Sun Jan 05, 2025   0047 X-rays negative for acute bony abnormality.  On reevaluation significant improvement in pain after pain control.  Now ambulatory.  Will discharge home with limited prescription for oxycodone along with supportive care.  Recommended follow-up with comprehensive spine along with PCP.  Patient agreeable plan.       Medications   lidocaine (LIDODERM) 5 % patch 1 patch (1 patch Topical Medication Applied 1/4/25 6500)   oxyCODONE (ROXICODONE) IR tablet 5 mg (5 mg Oral Given 1/4/25 2536)   diazepam  (VALIUM) tablet 5 mg (5 mg Oral Given 1/4/25 2346)   ketorolac (TORADOL) injection 15 mg (15 mg Intramuscular Given 1/4/25 2346)       ED Risk Strat Scores                          SBIRT 20yo+      Flowsheet Row Most Recent Value   Initial Alcohol Screen: US AUDIT-C     1. How often do you have a drink containing alcohol? 0 Filed at: 01/04/2025 2245   2. How many drinks containing alcohol do you have on a typical day you are drinking?  0 Filed at: 01/04/2025 2245   3a. Male UNDER 65: How often do you have five or more drinks on one occasion? 0 Filed at: 01/04/2025 2245   3b. FEMALE Any Age, or MALE 65+: How often do you have 4 or more drinks on one occassion? 0 Filed at: 01/04/2025 2245   Audit-C Score 0 Filed at: 01/04/2025 2245   SHELIA: How many times in the past year have you...    Used an illegal drug or used a prescription medication for non-medical reasons? Never Filed at: 01/04/2025 2245                            History of Present Illness       Chief Complaint   Patient presents with    Back Pain     Patient reports severe right sided back pain from mid right back radiating down to right hip and right leg. Seen at urgent care and prescribed muscle relaxant (Flexeril) - took dose @ 2030 with no relief to pain. Reports pain severe that he is barely able to ambulate. (-) injury/trauma       Past Medical History:   Diagnosis Date    Blood type A+     Other male erectile dysfunction 11/27/2018    Right ankle injury 11/04/2022      Past Surgical History:   Procedure Laterality Date    HERNIA REPAIR Left     twice repaired in past    TONSILLECTOMY        Family History   Problem Relation Age of Onset    Colon polyps Mother     Stroke Father 78    Diabetes Father 68    Alzheimer's disease Father     Heart attack Father         dx'd in 70s    Alcohol abuse Neg Hx     Substance Abuse Neg Hx     Mental illness Neg Hx     Depression Neg Hx       Social History     Tobacco Use    Smoking status: Never    Smokeless  tobacco: Never   Vaping Use    Vaping status: Never Used   Substance Use Topics    Alcohol use: Yes     Comment: occasional    Drug use: No      E-Cigarette/Vaping    E-Cigarette Use Never User       E-Cigarette/Vaping Substances    Nicotine No     THC No     CBD No     Flavoring No     Other No     Unknown No       I have reviewed and agree with the history as documented.     64-year-old male with history of HTN and RA presenting to the ED with complaints of nontraumatic right low back pain that radiates down his right leg that onset yesterday.  Patient denies specific injury or trauma.  States the pain is debilitating, limiting his ambulation, currently rating pain 20 out of 10.  Pain makes him slightly nauseous, but otherwise denies vomiting, diarrhea, abdominal pain.  Denies associated fever, chills, unilateral weakness, saddle anesthesia, bowel/bladder incontinence, history of malignancy, history of IV drug use, recent weight loss.  Was seen by urgent care prior to arrival and given Flexeril, with no relief of pain.  He has also been doing Aleve at home with minimal relief.            Review of Systems   Constitutional:  Negative for chills and fever.   HENT:  Negative for ear pain and sore throat.    Eyes:  Negative for pain and visual disturbance.   Respiratory:  Negative for cough and shortness of breath.    Cardiovascular:  Negative for chest pain and palpitations.   Gastrointestinal:  Positive for nausea. Negative for abdominal pain, blood in stool, constipation, diarrhea, rectal pain and vomiting.   Genitourinary:  Negative for dysuria, flank pain, hematuria and urgency.   Musculoskeletal:  Positive for back pain, gait problem and myalgias. Negative for arthralgias, neck pain and neck stiffness.   Skin:  Negative for color change and rash.   Neurological:  Negative for seizures, syncope, weakness and numbness.   All other systems reviewed and are negative.          Objective       ED Triage Vitals  [01/04/25 2246]   Temperature Pulse Blood Pressure Respirations SpO2 Patient Position - Orthostatic VS   98.2 °F (36.8 °C) 88 163/71 18 98 % Sitting      Temp Source Heart Rate Source BP Location FiO2 (%) Pain Score    Oral Monitor Right arm -- (S) 10 - Worst Possible Pain      Vitals      Date and Time Temp Pulse SpO2 Resp BP Pain Score FACES Pain Rating User   01/04/25 2346 -- -- -- -- -- 10 - Worst Possible Pain -- CH   01/04/25 2246 98.2 °F (36.8 °C) 88 98 % 18 163/71  10 - Worst Possible Pain -- SB            Physical Exam  Vitals and nursing note reviewed.   Constitutional:       General: He is in acute distress.      Appearance: He is well-developed. He is not toxic-appearing.      Comments: Patient sitting at side of bed hunched over in pain.   HENT:      Head: Normocephalic and atraumatic.      Right Ear: External ear normal.      Left Ear: External ear normal.      Nose: Nose normal. No congestion.      Mouth/Throat:      Mouth: Mucous membranes are moist.      Pharynx: Oropharynx is clear. No oropharyngeal exudate or posterior oropharyngeal erythema.   Eyes:      Extraocular Movements: Extraocular movements intact.      Conjunctiva/sclera: Conjunctivae normal.      Pupils: Pupils are equal, round, and reactive to light.   Cardiovascular:      Rate and Rhythm: Normal rate and regular rhythm.      Pulses: Normal pulses.      Heart sounds: Normal heart sounds. No murmur heard.     No friction rub. No gallop.   Pulmonary:      Effort: Pulmonary effort is normal. No respiratory distress.      Breath sounds: Normal breath sounds. No stridor. No wheezing, rhonchi or rales.   Abdominal:      General: Bowel sounds are normal.      Palpations: Abdomen is soft.      Tenderness: There is no abdominal tenderness. There is no right CVA tenderness, left CVA tenderness, guarding or rebound.   Musculoskeletal:         General: Tenderness present. No swelling or deformity.      Cervical back: Normal range of motion and  neck supple. No rigidity or tenderness.      Right lower leg: No edema.      Left lower leg: No edema.      Comments: No midline C/T/L-spine tenderness palpation or deformities.  Patient does have significant tenderness over right paralumbar musculature/quadratus lumborum.  Negative straight leg raise bilaterally.  No significant bony hip tenderness to palpation bilaterally.  5 out of 5 strength sensations intact to bilateral lower extremities.   Lymphadenopathy:      Cervical: No cervical adenopathy.   Skin:     General: Skin is warm and dry.      Capillary Refill: Capillary refill takes less than 2 seconds.      Coloration: Skin is not jaundiced or pale.      Findings: No bruising, erythema, lesion or rash.   Neurological:      General: No focal deficit present.      Mental Status: He is alert and oriented to person, place, and time. Mental status is at baseline.      GCS: GCS eye subscore is 4. GCS verbal subscore is 5. GCS motor subscore is 6.      Cranial Nerves: Cranial nerves 2-12 are intact. No cranial nerve deficit, dysarthria or facial asymmetry.      Sensory: Sensation is intact. No sensory deficit.      Motor: Motor function is intact. No abnormal muscle tone or pronator drift.      Coordination: Coordination is intact.      Gait: Gait is intact.   Psychiatric:         Mood and Affect: Mood normal.         Behavior: Behavior normal.         Thought Content: Thought content normal.         Results Reviewed       None            XR spine lumbar 2 or 3 views injury   ED Interpretation by Raphael Burgess DO (01/05 0033)   No acute fracture or misalignment of the lumbar spine.          Procedures    ED Medication and Procedure Management   Prior to Admission Medications   Prescriptions Last Dose Informant Patient Reported? Taking?   amLODIPine (NORVASC) 10 mg tablet   No No   Sig: TAKE 1 TABLET BY MOUTH EVERY DAY   aspirin 81 mg chewable tablet  Self No No   Sig: Chew 1 tablet (81 mg total) daily    cyclobenzaprine (FLEXERIL) 10 mg tablet 1/4/2025 at  8:30 PM  Yes Yes   Sig: Take 10 mg by mouth 3 (three) times a day as needed for muscle spasms   lisinopril (ZESTRIL) 5 mg tablet 1/3/2025 Self No Yes   Sig: Take 1 tablet (5 mg total) by mouth daily   sildenafil (VIAGRA) 50 MG tablet More than a month Self No No   Sig: Take 1 tablet (50 mg total) by mouth daily as needed for erectile dysfunction      Facility-Administered Medications: None     Discharge Medication List as of 1/5/2025 12:37 AM        START taking these medications    Details   acetaminophen (TYLENOL) 500 mg tablet Take 2 tablets (1,000 mg total) by mouth every 6 (six) hours as needed for mild pain for up to 7 days, Starting Sun 1/5/2025, Until Sun 1/12/2025 at 2359, Normal      lidocaine (LIDODERM) 5 % Apply 1 patch topically over 12 hours every 24 hours Remove & Discard patch within 12 hours or as directed by MD, Starting Sun 1/5/2025, Print      oxyCODONE (Roxicodone) 5 immediate release tablet Take 1 tablet (5 mg total) by mouth every 6 (six) hours as needed for moderate pain or severe pain (12) for up to 3 days Max Daily Amount: 20 mg, Starting Sun 1/5/2025, Until Wed 1/8/2025 at 2359, Normal           CONTINUE these medications which have NOT CHANGED    Details   cyclobenzaprine (FLEXERIL) 10 mg tablet Take 10 mg by mouth 3 (three) times a day as needed for muscle spasms, Starting Sat 1/4/2025, Historical Med      lisinopril (ZESTRIL) 5 mg tablet Take 1 tablet (5 mg total) by mouth daily, Starting Tue 10/1/2024, Normal      amLODIPine (NORVASC) 10 mg tablet TAKE 1 TABLET BY MOUTH EVERY DAY, Starting Mon 12/9/2024, Normal      aspirin 81 mg chewable tablet Chew 1 tablet (81 mg total) daily, Starting Sat 9/7/2024, Until Wed 10/9/2024, Normal      sildenafil (VIAGRA) 50 MG tablet Take 1 tablet (50 mg total) by mouth daily as needed for erectile dysfunction, Starting Tue 10/5/2021, Normal             ED SEPSIS DOCUMENTATION   Time reflects when  diagnosis was documented in both MDM as applicable and the Disposition within this note       Time User Action Codes Description Comment    1/5/2025 12:33 AM Isabella Rand [M54.50] Lower back pain                  Raphael Burgess DO  01/05/25 0100

## 2025-01-05 NOTE — ED ATTENDING ATTESTATION
1/4/2025  I, Isabella Rand MD, saw and evaluated the patient. I have discussed the patient with the resident/non-physician practitioner and agree with the resident's/non-physician practitioner's findings, Plan of Care, and MDM as documented in the resident's/non-physician practitioner's note, except where noted. All available labs and Radiology studies were reviewed.  I was present for key portions of any procedure(s) performed by the resident/non-physician practitioner and I was immediately available to provide assistance.       At this point I agree with the current assessment done in the Emergency Department.  I have conducted an independent evaluation of this patient a history and physical is as follows:    64-year-old presenting to the ER with right-sided lower acute back pain that goes down the right leg.  No trauma or falls.  No fevers or chills.  No history of malignancy.  Not on blood thinners.  Has not lost bowel or bladder control.  No weakness.  Pain makes it difficult to function.  No paresthesias.    On exam patient without any abdominal tenderness.  Does have some right lower back tenderness.  No weakness.  Nonfocal neurologically.    Agree with lumbar x-ray, pain control.    Reviewed lumbar x-ray, some disc height noted, no acute fractures, no signs of malignancy.  Recommend outpatient follow-up with comprehensive [facility].  Pain is improved in the ER.      ED Course         Critical Care Time  Procedures

## 2025-01-07 ENCOUNTER — NURSE TRIAGE (OUTPATIENT)
Dept: PHYSICAL THERAPY | Facility: OTHER | Age: 65
End: 2025-01-07

## 2025-01-07 ENCOUNTER — TELEPHONE (OUTPATIENT)
Dept: PHYSICAL THERAPY | Facility: OTHER | Age: 65
End: 2025-01-07

## 2025-01-07 DIAGNOSIS — M54.50 ACUTE RIGHT-SIDED LOW BACK PAIN, UNSPECIFIED WHETHER SCIATICA PRESENT: Primary | ICD-10-CM

## 2025-01-07 NOTE — TELEPHONE ENCOUNTER
Call placed to the patient per [facility] referral.    V/M left for patient to call back. Phone number and hours of business provided.    This is the 1st attempt to reach the patient. Will defer referral per protocol.    Mid-right sided low back pain.

## 2025-01-07 NOTE — TELEPHONE ENCOUNTER
Additional Information   Negative: Is this related to a work injury?   Negative: Is this related to an MVA?   Negative: Are you currently recieving homecare services?    Background - Initial Assessment  Clinical complaint: Pain is right low back and right hip down to right knee. Has had the right hip and knee pain for awhile now. Being treated by Ortho/Dr Wooten, and receiving injections. Pain expanded to right low back and is worse, starting 12/4/25.  No numbness or tingling. No prior back SX. Pt is currently back to wearing his knee brace, as he is having trouble standing or walking. Pain is a 8 or 9/10 currently. Pain is worse with any movement, and is constant. Seen in UC and ED 1/4/25. Medications are not helping.   Date of onset: 12/4/25  Frequency of pain: constant  Quality of pain: sharp and shooting    Protocols used: [facility] Protocol

## 2025-01-08 NOTE — TELEPHONE ENCOUNTER
Patient called this morning requesting to speak with the nurse.     A nurse was not available at the time of the call.    A Teams message was sent to the triage nurse to call patient back when available to complete triage.

## 2025-01-08 NOTE — TELEPHONE ENCOUNTER
Additional Information   Negative: Has the patient had unexplained weight loss?   Negative: Does the patient have a fever?   Negative: Is the patient experiencing urine retention?   Negative: Is the patient experiencing blood in sputum?   Negative: Has the patient experienced major trauma? (fall from height, high speed collision, direct blow to spine) and is also experiencing nausea, light-headedness, or loss of consciousness?   Negative: Is the patient experiencing acute drop foot or paralysis?   Negative: Is this a chronic condition?    Protocols used: [facility] Protocol  Nurse completed the triage and NO RF s/s were present. Referral entered for the [facility] site and the contact/phone number information was given to the patient as well.   Patients information was sent to the preferred site and pt made aware clerical would be calling to schedule the evaluation appointment. Nurse encouraged him to call the site if he does not hear from clerical beforehand. Patient Agreed.    Patient did not voice any additional questions or concerns at this time.   Patient is aware current/past complaints, relevant dx, additional referrals and treatment/options will be discussed at time of evaluation/consultation appointment.   Patient is in agreement with plan.  Patient very appreciative of CB and referral placement.     Nurse wished him well and the CS referral was closed.

## 2025-01-09 ENCOUNTER — EVALUATION (OUTPATIENT)
Dept: PHYSICAL THERAPY | Facility: CLINIC | Age: 65
End: 2025-01-09
Payer: COMMERCIAL

## 2025-01-09 VITALS — TEMPERATURE: 97.3 F | SYSTOLIC BLOOD PRESSURE: 140 MMHG | DIASTOLIC BLOOD PRESSURE: 70 MMHG | HEART RATE: 85 BPM

## 2025-01-09 DIAGNOSIS — M54.50 ACUTE RIGHT-SIDED LOW BACK PAIN, UNSPECIFIED WHETHER SCIATICA PRESENT: Primary | ICD-10-CM

## 2025-01-09 PROCEDURE — 97162 PT EVAL MOD COMPLEX 30 MIN: CPT | Performed by: PHYSICAL THERAPIST

## 2025-01-09 PROCEDURE — 97110 THERAPEUTIC EXERCISES: CPT | Performed by: PHYSICAL THERAPIST

## 2025-01-09 NOTE — PROGRESS NOTES
PT Evaluation  Comp Spine    Today's date: 2025  Patient name: Yehuda Trejo  : 1960  MRN: [id number]  Referring provider: Ga Summers PT  Dx:   Encounter Diagnosis     ICD-10-CM    1. Acute right-sided low back pain, unspecified whether sciatica present  M54.50 Ambulatory referral to PT spine          Start Time: 1345  Stop Time: 1430  Total time in clinic (min): 45 minutes    Assessment  Impairments: abnormal gait, abnormal muscle firing, abnormal muscle tone, abnormal or restricted ROM, abnormal movement, activity intolerance, impaired physical strength, lacks appropriate home exercise program, pain with function and poor posture     Assessment details: Yehuda Trejo is a pleasant 64 y.o. male who presents to [facility] spine program with flexion preference lumbar radiculopathy.  The patient's greatest concerns are worry over not knowing what's wrong, concern at no signs of improvement, wanting to avoid surgery, and fear of not being able to keep active.      No further referral appears necessary at this time based upon examination results.    Primary movement impairment diagnosis of lumbar extension dysfunction resulting in pathoanatomical symptoms of Acute right-sided low back pain, unspecified whether sciatica present  (primary encounter diagnosis) and limiting his ability to exercise or recreation, get out of a chair, go to work, lift, perform household chores, squat to  objects from the floor, stand, and walk.    Impairments include:  1) lumbar ext dysfunction  2) neural tension    Etiologic factors include none recalled by the patient.    Discussed risks, benefits, and alternatives to treatment, and answered all patient questions to patient satisfaction.  Understanding of Dx/Px/POC: good     Prognosis: good    Goals  Impairment Goals 4-6 weeks  - Decrease pain to <3/10  - Improve lumbar AROM to >75% throughout  - Increase hip strength to 4+/5 throughout  - Increase core strength to be  able to sit straight up without deviation    Functional Goals 6-8 weeks  - Return to Prior Level of Function  - Patient will be independent with HEP  - Patient will be able to stand without increased pain/compensation/difficulty  - Patient will be able to perform sit to stand without increased pain/compensation/difficulty   - Patient will be able to walk without increased pain/compensation/difficulty   - Patient will be able to lift with proper mechanics and no pain      Plan  Patient would benefit from: skilled physical therapy  Planned modality interventions: cryotherapy, TENS, thermotherapy: hydrocollator packs and unattended electrical stimulation    Planned therapy interventions: abdominal trunk stabilization, behavior modification, body mechanics training, breathing training, flexibility, functional ROM exercises, home exercise program, joint mobilization, manual therapy, massage, Campos taping, muscle pump exercises, neuromuscular re-education, patient education, postural training, strengthening, stretching, therapeutic activities, therapeutic exercise and therapeutic training    Frequency: 1x week  Duration in weeks: 4  Treatment plan discussed with: patient  Plan details: Prognosis above is given PT services 2x/week tapering to 1x/week over the next month and home program adherence.      Subjective Evaluation    History of Present Illness  Mechanism of injury: WORK/SCHOOL: works full-time with [employer], [employer] work  HOME LIFE: lives with wife, stairs in the home  HOBBIES/EXERCISE: walking daily 7-10 miles  PLOF:  hx of TIA in September, high BP, OA in R knee and R hip, gets shots for hip and knee  HISTORY OF CURRENT INJURY:  Friday night, patient was walking 1.5 blocks down a hill and started having horrible pain in R low back. By the next day it started going down the R leg. He went to the ER on Saturday night who treated him for pain and prescribed muscle relaxers and pain  relievers. He is not taking the oxy and is not interested. He does not feel the meds are helping much. He is wearing his knee brace which is helping. He has chronic R hip and knee pain but this pain is different.  PAIN LOCATION/DESCRIPTORS: R hip/back, down R thigh and into shin  AGGRAVATING FACTORS:  walking, stairs, standing  EASES: sitting, lidocane patch  DAY PATTERN: no  IMAGING:  x-ray  FINDINGS:  No acute fracture. Intact pedicles.  Five non-rib-bearing lumbar vertebral bodies.  Straightening of lumbar lordosis but otherwise normal alignment without subluxation or compression deformity.  Mild facet spondylosis but otherwise no significant degenerative change.  Unremarkable soft tissues.  SPECIAL QUESTIONS:    Yehuda denies a new onset of Tingling, Numbness, and Saddle anesthesia . No hx of CA  PELVIC FLOOR: Do you have pelvic pain or chronic constipation? No  PATIENT GOALS: Patient wishes to resolve his pain so he can continue walking program to lose weight.     *Patient completed [facility] with nurse on the phone and was offered behavioral health services then.     Patient Goals  Patient goals for therapy: decreased pain, increased motion, increased strength, independence with ADLs/IADLs, return to sport/leisure activities, return to work and improved balance    Pain  Current pain ratin  At best pain ratin  At worst pain ratin  Location: R back, hip and R leg  Quality: sharp, radiating and tight  Progression: improved        Objective     Active Range of Motion     Lumbar   Flexion:  WFL  Extension:  with pain Restriction level: maximal  Left lateral flexion:  Restriction level: minimal  Right lateral flexion:  Restriction level: minimal  Left rotation:  Restriction level: minimal  Right rotation:  Restriction level: minimal    Additional Active Range of Motion Details  Stands at 20 degrees flexion  Extension peripheralizes symptoms, flexion centralizes  FF in standing centralized from thigh to  hip/back    Strength/Myotome Testing     Lumbar   Left   Normal strength    Right Hip   Planes of Motion   Flexion: 3+    Right Knee   Flexion: 4+  Extension: 4+    Right Ankle/Foot   Dorsiflexion: 4+  Plantar flexion: 4+    Tests     Lumbar   Negative Gaenslen's .     Right   Positive passive SLR.   Negative slump test.     Right Pelvic Girdle/Sacrum   Positive: thigh thrust.   Negative: active SLR test.     Right Hip   Negative DIAMOND and FADIR.     Additional Tests Details  SKTC improved pain in hip and back    Patient with diaphragm compensation with every movement, holding breath              POC Expires Auth Status Start Date Expiration Date PT Visit Limit           Date        Used        Remaining           Diagnosis: Acute lumbar radiculopathy   Precautions:    Primary Goals: lumbar flx to centralize, neural tension, TA   *asterisks by exercise = given for HEP   Manuals 1/9            DO FOTO                                   There Ex        Bike        SKTC* 2x10       DKTC* x10       FF in chair* X10 5 sec hold       FF in standing* x10       3 way ball rolls        Supine nerve glide                        Neuro Re-Ed        TA        Kegel        Pelvic tilts        Seated viridiana/MARQUISE                                                                Patient education Diagnosis, prognosis, activity modification, log roll        Re-evaluation              Ther Act                                         Modalities

## 2025-01-15 DIAGNOSIS — I10 PRIMARY HYPERTENSION: ICD-10-CM

## 2025-01-15 RX ORDER — LISINOPRIL 5 MG/1
5 TABLET ORAL DAILY
Qty: 90 TABLET | Refills: 1 | Status: SHIPPED | OUTPATIENT
Start: 2025-01-15

## 2025-01-16 ENCOUNTER — OFFICE VISIT (OUTPATIENT)
Dept: PHYSICAL THERAPY | Facility: CLINIC | Age: 65
End: 2025-01-16
Payer: COMMERCIAL

## 2025-01-16 DIAGNOSIS — M54.50 ACUTE RIGHT-SIDED LOW BACK PAIN, UNSPECIFIED WHETHER SCIATICA PRESENT: Primary | ICD-10-CM

## 2025-01-16 PROCEDURE — 97112 NEUROMUSCULAR REEDUCATION: CPT | Performed by: PHYSICAL THERAPIST

## 2025-01-16 PROCEDURE — 97110 THERAPEUTIC EXERCISES: CPT | Performed by: PHYSICAL THERAPIST

## 2025-01-16 NOTE — PROGRESS NOTES
Daily Note     Today's date: 2025  Patient name: Yehuda Trejo  : 1960  MRN: [id number]  Referring provider: Ga Summers, PT  Dx:   Encounter Diagnosis     ICD-10-CM    1. Acute right-sided low back pain, unspecified whether sciatica present  M54.50           Start Time: 0730  Stop Time: 0810  Total time in clinic (min): 40 minutes    Subjective: Patient reports he laughed so hard over the weekend that he hurt his back again. He feels he had a setback because of this. Overall, he has a lot of pain. He is doing the exercises and at work he is modifying and not lifting. Heat is helping. He does not have the pain as much in the knee anymore.     Objective: See treatment diary below    Assessment: Began session on recumbent bike for blood flow and ROM, with improved pain following. He does have chronic R hip pain that limites his participation in some lumbar and hip flexion activities. Added  hip flexor stretching to address anterior hip tightness, but this was not tolerated well on the right side. LAD did not improve hip pain significantly. Added TA and kegel which he performed well with cuing to prevent diaphragm compensation. Added these to HEP with education to also utilize during functional activities. Overall improvement in pain and mobility noted following visit.    Plan: Continue per plan of care.         POC Expires Auth Status Start Date Expiration Date PT Visit Limit           Date        Used        Remaining           Diagnosis: Acute lumbar radiculopathy, R side   Precautions:    Primary Goals: lumbar flx to centralize, neural tension, TA   *asterisks by exercise = given for HEP   Manuals       LAD     DO FOTO                                   There Ex        Bike  5 min recumbent for ROM      SKTC* 2x10 X 10 teri 5 sec hold      DKTC* x10 X 10 teri 5 sec hold      FF in chair* X10 5 sec hold       FF in standing* x10       3 way ball rolls  5 sec x 5, improved pain      Supine  nerve glide        Quad S        Hip flexor S  Modified gio 3x30 sec L, straightening R leg on table 3x20 sec      LTR                Neuro Re-Ed        TA*  2 min focus on diaphragmatic breathing      Miguel*  2 min focus on diaphragmatic breathing      Pelvic tilts        Seated viridiana/MARQUISE                                                                Patient education Diagnosis, prognosis, activity modification, log roll        Re-evaluation              Ther Act                                         Modalities

## 2025-01-23 ENCOUNTER — OFFICE VISIT (OUTPATIENT)
Dept: PHYSICAL THERAPY | Facility: CLINIC | Age: 65
End: 2025-01-23
Payer: COMMERCIAL

## 2025-01-23 DIAGNOSIS — M54.50 ACUTE RIGHT-SIDED LOW BACK PAIN, UNSPECIFIED WHETHER SCIATICA PRESENT: Primary | ICD-10-CM

## 2025-01-23 PROCEDURE — 97112 NEUROMUSCULAR REEDUCATION: CPT

## 2025-01-23 PROCEDURE — 97140 MANUAL THERAPY 1/> REGIONS: CPT

## 2025-01-23 PROCEDURE — 97110 THERAPEUTIC EXERCISES: CPT

## 2025-01-23 NOTE — PROGRESS NOTES
Daily Note     Today's date: 2025  Patient name: Yehuda Trejo  : 1960  MRN: [id number]  Referring provider: Ga Summers, PT  Dx:   Encounter Diagnosis     ICD-10-CM    1. Acute right-sided low back pain, unspecified whether sciatica present  M54.50           Start Time: 0700  Stop Time: 0745  Total time in clinic (min): 45 minutes    Subjective: Patient states he was dancing over the weekend and his hip is really bothering him.       Objective: See treatment diary below      Assessment: Continued with outlined program. Patient responded well to ball roll outs and DKTC. Trialed adductor S with a little bit of pain when he was not able to relax, but upon relaxing less pain. Patient instructed on pelvic tilts with cues for breathing. He did well with hip flexor stretch with cues to avoid pain but focus on stretching. He states he feels better upon completion of session than when he arrived.       Plan: Continue per plan of care.         POC Expires Auth Status Start Date Expiration Date PT Visit Limit           Date        Used        Remaining           Diagnosis: Acute lumbar radiculopathy, R side   Precautions:    Primary Goals: lumbar flx to centralize, neural tension, TA   *asterisks by exercise = given for HEP   Manuals      LAD     DO FOTO                                   There Ex        Bike  5 min recumbent for ROM 5 duc recumbent for ROM     SKTC* 2x10 X 10 teri 5 sec hold      DKTC* x10 X 10 teri 5 sec hold      FF in chair* X10 5 sec hold       FF in standing* x10       3 way ball rolls  5 sec x 5, improved pain 5 sec x5     Supine nerve glide        Quad S        Hip flexor S  Modified gio 3x30 sec L, straightening R leg on table 3x20 sec      LTR        Standing hip flexor S    10x10 sec teri                      Neuro Re-Ed        TA*  2 min focus on diaphragmatic breathing      Kegel*  2 min focus on diaphragmatic breathing      Pelvic tilts   5 sec x10 with  diaphragmatic breathing    Add marches 10x teri alternating with PPT     Seated marches/LAQ   Marches 2x10 with TA                                                              Patient education Diagnosis, prognosis, activity modification, log roll        Re-evaluation              Ther Act                                         Modalities

## 2025-01-30 ENCOUNTER — OFFICE VISIT (OUTPATIENT)
Dept: PHYSICAL THERAPY | Facility: CLINIC | Age: 65
End: 2025-01-30
Payer: COMMERCIAL

## 2025-01-30 DIAGNOSIS — M54.50 ACUTE RIGHT-SIDED LOW BACK PAIN, UNSPECIFIED WHETHER SCIATICA PRESENT: Primary | ICD-10-CM

## 2025-01-30 PROCEDURE — 97110 THERAPEUTIC EXERCISES: CPT | Performed by: PHYSICAL THERAPIST

## 2025-01-30 PROCEDURE — 97112 NEUROMUSCULAR REEDUCATION: CPT | Performed by: PHYSICAL THERAPIST

## 2025-01-30 NOTE — PROGRESS NOTES
Daily Note     Today's date: 2025  Patient name: Yehuda Trejo  : 1960  MRN: [id number]  Referring provider: Ga Summers, STEVE  Dx:   Encounter Diagnosis     ICD-10-CM    1. Acute right-sided low back pain, unspecified whether sciatica present  M54.50           Start Time: 0700  Stop Time: 0745  Total time in clinic (min): 45 minutes    Subjective: Patient had some numbness in his R knee the last 2 days that comes and goes. He has less pain in the back so he knows he is improving. He is sleeping better. He feels 85% improved right now. He can do stairs better too. Standing and lifting his grand-daughter continue to be his primary issues.     Objective: See treatment diary below    Assessment: Patient is making steady progress with PT at this time. Education provided about activation of TA and posture in standing, and how to utilize this muscle during standing and walking activities to increase tolerance. Patient also instructed to pause and do his flexion exercises when walking for activity modification. He noted understanding and will trial going on a walk and doing some work in the shop this weekend.    Plan: Continue per plan of care.         POC Expires Auth Status Start Date Expiration Date PT Visit Limit           Date        Used        Remaining           Diagnosis: Acute lumbar radiculopathy, R side   Precautions:    Primary Goals: lumbar flx to centralize, neural tension, TA   *asterisks by exercise = given for HEP   Manuals     LAD     DO FOTO                                   There Ex        Bike  5 min recumbent for ROM 5 duc recumbent for ROM 5 duc recumbent for ROM    SKTC* 2x10 X 10 teri 5 sec hold      DKTC* x10 X 10 teri 5 sec hold      FF in chair* X10 5 sec hold       FF in standing* x10       3 way ball rolls  5 sec x 5, improved pain 5 sec x5 5 sec x 5    Supine nerve glide        Quad S        Hip flexor S  Modified gio 3x30 sec L, straightening R leg on  table 3x20 sec      LTR        Standing hip flexor S    10x10 sec teri      Hip ER S    10x10 sec R sitting in chair            Neuro Re-Ed        TA*  2 min focus on diaphragmatic breathing  1 min sitting    Kegel*  2 min focus on diaphragmatic breathing  1 min sitting    Pelvic tilts   5 sec x10 with diaphragmatic breathing    Add marches 10x teri alternating with PPT In sitting x 20    Seated marches/LAQ   Marches 2x10 with TA      Standing posture    Reviewed with TA, trialed ambulation with TA    MB press    Seated 10# KB 2 x 10 forward                                            Patient education Diagnosis, prognosis, activity modification, log roll   Discussed activity modification     Re-evaluation              Ther Act                                         Modalities

## 2025-02-06 ENCOUNTER — OFFICE VISIT (OUTPATIENT)
Dept: PHYSICAL THERAPY | Facility: CLINIC | Age: 65
End: 2025-02-06
Payer: COMMERCIAL

## 2025-02-06 DIAGNOSIS — M54.50 ACUTE RIGHT-SIDED LOW BACK PAIN, UNSPECIFIED WHETHER SCIATICA PRESENT: Primary | ICD-10-CM

## 2025-02-06 PROCEDURE — 97110 THERAPEUTIC EXERCISES: CPT | Performed by: PHYSICAL THERAPIST

## 2025-02-06 PROCEDURE — 97112 NEUROMUSCULAR REEDUCATION: CPT | Performed by: PHYSICAL THERAPIST

## 2025-02-06 NOTE — PROGRESS NOTES
Daily Note     Today's date: 2025  Patient name: Yehuda Trejo  : 1960  MRN: [id number]  Referring provider: aG Summers, PT  Dx:   Encounter Diagnosis     ICD-10-CM    1. Acute right-sided low back pain, unspecified whether sciatica present  M54.50           Start Time: 1700  Stop Time: 1745  Total time in clinic (min): 45 minutes    Subjective: Patient reports he is doing pretty good. Going up the stairs hurts but he feels this is his hip. He tried to work Saturday but he has not yet been able to stand or walk as long as he'd like. He is modifying his walking activities and bending as discussed which helps.     Objective: See treatment diary below    Assessment: FOTO reassessed today with improvement in score demonstrating progress towards functional goals. Introduced nerve glides today in sitting and supine which were effective for the patient. He will add this to HEP. Patient demonstrates excellent form.    Plan: RE NV.        POC Expires Auth Status Start Date Expiration Date PT Visit Limit           Date        Used        Remaining           Diagnosis: Acute lumbar radiculopathy, R side   Precautions:    Primary Goals: lumbar flx to centralize, neural tension, TA   *asterisks by exercise = given for HEP   Manuals    LAD     DO FOTO                                   There Ex        Bike  5 min recumbent for ROM 5 duc recumbent for ROM 5 duc recumbent for ROM 5 duc recumbent for ROM   SKTC* 2x10 X 10 teri 5 sec hold   X 10   DKTC* x10 X 10 teri 5 sec hold      FF in chair* X10 5 sec hold       FF in standing* x10       3 way ball rolls  5 sec x 5, improved pain 5 sec x5 5 sec x 5 5 sec x 5   Supine nerve glide     X 10   Quad S        Hip flexor S  Modified gio 3x30 sec L, straightening R leg on table 3x20 sec      LTR        Standing hip flexor S    10x10 sec teri      Hip ER S    10x10 sec R sitting in chair 10x10 sec R sitting in chair   Slump nerve glide     X 10    Neuro Re-Ed        TA*  2 min focus on diaphragmatic breathing  1 min sitting    Kegel*  2 min focus on diaphragmatic breathing  1 min sitting    Pelvic tilts   5 sec x10 with diaphragmatic breathing    Add marches 10x teri alternating with PPT In sitting x 20 In sitting x 20   Seated marches/LAQ   Marches 2x10 with TA      Standing posture    Reviewed with TA, trialed ambulation with TA    MB press    Seated 10# KB 2 x 10 forward    Standing MB press     Trialed, immediate numbness in R knee                                   Patient education Diagnosis, prognosis, activity modification, log roll   Discussed activity modification     Re-evaluation              Ther Act                                         Modalities

## 2025-02-07 ENCOUNTER — OFFICE VISIT (OUTPATIENT)
Dept: OBGYN CLINIC | Facility: CLINIC | Age: 65
End: 2025-02-07
Payer: COMMERCIAL

## 2025-02-07 VITALS — BODY MASS INDEX: 34.21 KG/M2 | HEIGHT: 69 IN | WEIGHT: 231 LBS

## 2025-02-07 DIAGNOSIS — M17.11 PRIMARY OSTEOARTHRITIS OF RIGHT KNEE: ICD-10-CM

## 2025-02-07 DIAGNOSIS — M16.11 PRIMARY OSTEOARTHRITIS OF ONE HIP, RIGHT: Primary | ICD-10-CM

## 2025-02-07 PROCEDURE — 20611 DRAIN/INJ JOINT/BURSA W/US: CPT | Performed by: PHYSICAL MEDICINE & REHABILITATION

## 2025-02-07 PROCEDURE — 99214 OFFICE O/P EST MOD 30 MIN: CPT | Performed by: PHYSICAL MEDICINE & REHABILITATION

## 2025-02-07 RX ORDER — TRIAMCINOLONE ACETONIDE 40 MG/ML
80 INJECTION, SUSPENSION INTRA-ARTICULAR; INTRAMUSCULAR
Status: COMPLETED | OUTPATIENT
Start: 2025-02-07 | End: 2025-02-07

## 2025-02-07 RX ORDER — ROPIVACAINE HYDROCHLORIDE 5 MG/ML
10 INJECTION, SOLUTION EPIDURAL; INFILTRATION; PERINEURAL
Status: COMPLETED | OUTPATIENT
Start: 2025-02-07 | End: 2025-02-07

## 2025-02-07 RX ADMIN — ROPIVACAINE HYDROCHLORIDE 10 ML: 5 INJECTION, SOLUTION EPIDURAL; INFILTRATION; PERINEURAL at 08:00

## 2025-02-07 RX ADMIN — TRIAMCINOLONE ACETONIDE 80 MG: 40 INJECTION, SUSPENSION INTRA-ARTICULAR; INTRAMUSCULAR at 08:00

## 2025-02-07 NOTE — PROGRESS NOTES
1. Primary osteoarthritis of one hip, right  Large joint arthrocentesis: R hip joint      2. Primary osteoarthritis of right knee          Orders Placed This Encounter   Procedures    Large joint arthrocentesis: R hip joint        Impression:  Patient is here in follow up of right hip osteoarthritis.  The patient is a good candidate for repeat ultrasound-guided right hip joint steroid/anesthetic injection (last 10/2024).  Please see procedure note below.  Patient tolerated the procedure and had immediate relief of symptoms.  RTC in 3 months or after.    Patient also has right knee pain due to severe patellofemoral osteoarthritis.  Pain could be compensatory from his lumbar spine and/or referred from the hip.  I will see him back in 3-4 weeks and if still symptomatic, we will proceed with right knee intra-articular injection.     Imaging Studies (I personally reviewed images in PACS and report):  Right hip x-rays most recent to this encounter reviewed.  These images show mild-moderate osteoarthritis of the right hip joint as evidence by subchondral sclerosis, acetabular osteophyte and offset of femoral head and neck junction.    Right knee x-rays show severe patellofemoral OA.  Small joint effusion.    No follow-ups on file.    Patient is in agreement with the above plan.    HPI:  Yehuda Trejo is a 64 y.o. male  who presents in follow up.  Here for   Chief Complaint   Patient presents with    Right Hip - Pain, Follow-up       Since last visit: See above.    Following history reviewed and updated:  Past Medical History:   Diagnosis Date    Blood type A+     Other male erectile dysfunction 11/27/2018    Right ankle injury 11/04/2022     Past Surgical History:   Procedure Laterality Date    HERNIA REPAIR Left     twice repaired in past    TONSILLECTOMY       Social History   Social History     Substance and Sexual Activity   Alcohol Use Yes    Comment: occasional     Social History     Substance and Sexual Activity  "  Drug Use No     Social History     Tobacco Use   Smoking Status Never   Smokeless Tobacco Never     Family History   Problem Relation Age of Onset    Colon polyps Mother     Stroke Father 78    Diabetes Father 68    Alzheimer's disease Father     Heart attack Father         dx'd in 70s    Alcohol abuse Neg Hx     Substance Abuse Neg Hx     Mental illness Neg Hx     Depression Neg Hx      No Known Allergies     Constitutional:  Ht 5' 9\" (1.753 m)   Wt 105 kg (231 lb)   BMI 34.11 kg/m²    General: NAD.  Eyes: Clear sclerae.  ENT: No inflammation, lesion, or mass of lips.  No tracheal deviation.  Musculoskeletal: As mentioned below.  Integumentary: No visible rashes or skin lesions.  Pulmonary/Chest: Effort normal. No respiratory distress.   Neuro: CN's grossly intact, PALMA.  Psych: Normal affect and judgement.  Vascular: WWP.    Right Knee Exam     Tenderness   The patient is experiencing tenderness in the medial joint line.    Range of Motion   Extension:  normal       Right Hip Exam     Range of Motion   Internal rotation:  abnormal     Other   Erythema: absent  Scars: absent  Sensation: normal  Pulse: present             Large joint arthrocentesis: R hip joint  Universal Protocol:  Procedure performed by:  Consent: Verbal consent obtained. Written consent not obtained.  Consent given by: patient  Timeout called at: 2/7/2025 7:53 AM.  Patient understanding: patient states understanding of the procedure being performed  Site marked: the operative site was marked  Radiology Images displayed and confirmed. If images not available, report reviewed: imaging studies available  Patient identity confirmed: verbally with patient  Supporting Documentation  Indications: pain and diagnostic evaluation   Procedure Details  Location: hip - R hip joint  Ultrasound guidance: yes (US guidance was used to find the area of interest.)  Medications administered: 80 mg triamcinolone acetonide 40 mg/mL; 10 mL ropivacaine 0.5 " %    Patient tolerance: patient tolerated the procedure well with no immediate complications  Dressing:  Sterile dressing applied    The right hip joint was visualized with ultrasound and injected with steroid/anesthetic solution as indicated.    Prior to the injection, the ultrasound was used to evaluate for any neural or vascular structures.  Care was taken to avoid these structures.    The images (and video if taken) were saved to the [employer] ultrasound system.    Risks of this procedure include:    - Risk of bleeding since a needle is involved.  - Risk of infection (1/10,000 chance as per recent studies).  Signs/symptoms were discussed and they would prompt an urgent evaluation at an emergency department.  - Risk of pigmentation or skin dimpling in the skin (2-3% chance as per recent studies) from the steroid.  - Risk of increased pain from steroid flare (1% chance as per recent studies) that typically lasts 24-48 hours.  - Risk of increased blood sugars from the steroid medication that can last for a few weeks.  If the patient is a diabetic or pre-diabetic, they were encouraged to closely monitor their blood sugars and discuss with PCP if elevated more than usual or if having symptoms.    We decided that the benefits outweigh the risks and so we proceeded with the procedure.

## 2025-02-13 ENCOUNTER — EVALUATION (OUTPATIENT)
Dept: PHYSICAL THERAPY | Facility: CLINIC | Age: 65
End: 2025-02-13
Payer: COMMERCIAL

## 2025-02-13 DIAGNOSIS — M54.50 ACUTE RIGHT-SIDED LOW BACK PAIN, UNSPECIFIED WHETHER SCIATICA PRESENT: Primary | ICD-10-CM

## 2025-02-13 PROCEDURE — 97112 NEUROMUSCULAR REEDUCATION: CPT | Performed by: PHYSICAL THERAPIST

## 2025-02-13 NOTE — PROGRESS NOTES
PT Discharge     Today's date: 2025  Patient name: Yehuda Trejo  : 1960  MRN: [id number]  Referring provider: Ga Summers PT  Dx:   Encounter Diagnosis     ICD-10-CM    1. Acute right-sided low back pain, unspecified whether sciatica present  M54.50           Start Time: 0700  Stop Time: 0730  Total time in clinic (min): 30 minutes    Assessment  Impairments: impaired physical strength and pain with function    Assessment details: Yehuda Trejo has been compliant with attending physical therapy and completing home exercise program since initial evaluation.  Yehuda  has made improvements in objective data since initial evalulation and has achieved all goals.  Patient reports having returned to their prior level of function. Patient provided with updated Home Exercise Program, all questions answered, and verbalized understanding, agreeing to plan of care. Thus it was mutually decided to discontinue this episode of care and transition to Home Exercise Program.     Understanding of Dx/Px/POC: good     Prognosis: good    Goals  Impairment Goals 4-6 weeks  - Decrease pain to <3/10 -met  - Improve lumbar AROM to >75% throughout - met  - Increase hip strength to 4+/5 throughout - partially met  - Increase core strength to be able to sit straight up without deviation - partially met    Functional Goals 6-8 weeks  - Return to Prior Level of Function - met  - Patient will be independent with HEP - met  - Patient will be able to stand without increased pain/compensation/difficulty - met  - Patient will be able to perform sit to stand without increased pain/compensation/difficulty - met  - Patient will be able to walk without increased pain/compensation/difficulty - met  - Patient will be able to lift with proper mechanics and no pain - met      Plan    Planned therapy interventions: home exercise program    Treatment plan discussed with: patient      Subjective Evaluation    History of Present  Illness  Mechanism of injury: WORK/SCHOOL: works full-time with custom firearm refinishing, computer work  HOME LIFE: lives with wife, stairs in the home  HOBBIES/EXERCISE: walking daily 7-10 miles  PLOF:  hx of TIA in September, high BP, OA in R knee and R hip, gets shots for hip and knee  HISTORY OF CURRENT INJURY:  Friday night, patient was walking 1.5 blocks down a hill and started having horrible pain in R low back. By the next day it started going down the R leg. He went to the ER on Saturday night who treated him for pain and prescribed muscle relaxers and pain relievers. He is not taking the oxy and is not interested. He does not feel the meds are helping much. He is wearing his knee brace which is helping. He has chronic R hip and knee pain but this pain is different.    Update:    Patient notes he is improving greatly. He went back to walking longer durations, and has been back to work now. He can walk 5 miles if he has to but he gets pain. He still has some numbness/tingling about 20 times a day in R to the knee but his exercises help it. He gets tightness in his thigh still. He got an injection in his R hip last week so his hip and knee pain is improved. He notices a big change in his mobility. He can play on the floor with his granddaughter. He has been consistent with HEP.   PAIN LOCATION/DESCRIPTORS: R hip/back, down R thigh but no longer into the shin. Minimal  AGGRAVATING FACTORS:  walking, standing  Improved: stairs, walking, standing  EASES: sitting, lidocane patch  DAY PATTERN: no  IMAGING:  x-ray  FINDINGS:  No acute fracture. Intact pedicles.  Five non-rib-bearing lumbar vertebral bodies.  Straightening of lumbar lordosis but otherwise normal alignment without subluxation or compression deformity.  Mild facet spondylosis but otherwise no significant degenerative change.  Unremarkable soft tissues.  SPECIAL QUESTIONS:    Yehuda denies a new onset of Tingling, Numbness, and Saddle anesthesia . No  hx of CA  PELVIC FLOOR: Do you have pelvic pain or chronic constipation? No  PATIENT GOALS: Patient wishes to resolve his pain so he can continue walking program to lose weight. -100% improved, can do it now.   Patient Goals  Patient goals for therapy: decreased pain, increased motion, increased strength, independence with ADLs/IADLs, return to sport/leisure activities, return to work and improved balance    Pain  Current pain ratin  At best pain ratin  At worst pain rating: 3  Location: R back, hip and R leg  Quality: tight and radiating  Progression: improved        Objective     Active Range of Motion     Lumbar   Flexion:  WFL  Extension: 15 degrees  with pain Restriction level: moderate  Left lateral flexion:  WFL  Right lateral flexion:  WFL  Left rotation:  WFL  Right rotation:  WFL    Additional Active Range of Motion Details  Stands at 10 degrees flexion  Extension peripheralizes symptoms, flexion centralizes    Strength/Myotome Testing     Lumbar   Left   Normal strength    Right Hip   Planes of Motion   Flexion: 4-    Right Knee   Flexion: 4+  Extension: 4+    Right Ankle/Foot   Dorsiflexion: 4+  Plantar flexion: 4+    Tests     Lumbar   Negative Gaenslen's .     Right   Negative passive SLR and slump test.     Right Pelvic Girdle/Sacrum   Positive: thigh thrust.   Negative: active SLR test.     Right Hip   Negative DIAMOND and FADIR.     Additional Tests Details  SKTC improved pain in hip and back    Patient about to activate TA well and prevent diaphragm compensation    Hip mobility improved on R side with less pain              POC Expires Auth Status Start Date Expiration Date PT Visit Limit           Date        Used        Remaining           Diagnosis: Acute lumbar radiculopathy, R side   Precautions:    Primary Goals: lumbar flx to centralize, neural tension, TA   *asterisks by exercise = given for HEP   Manuals  2/6   FIOR     DO FOTO                                    There Ex        Bike 5 duc recumbent for ROM 5 min recumbent for ROM 5 duc recumbent for ROM 5 duc recumbent for ROM 5 duc recumbent for ROM   SKTC*  X 10 teri 5 sec hold   X 10   DKTC*  X 10 teri 5 sec hold      FF in chair*        FF in standing*        3 way ball rolls  5 sec x 5, improved pain 5 sec x5 5 sec x 5 5 sec x 5   Supine nerve glide     X 10   Quad S        Hip flexor S  Modified gio 3x30 sec L, straightening R leg on table 3x20 sec      LTR        Standing hip flexor S    10x10 sec teri      Hip ER S    10x10 sec R sitting in chair 10x10 sec R sitting in chair   Slump nerve glide     X 10   Neuro Re-Ed        TA*  2 min focus on diaphragmatic breathing  1 min sitting    Kegel*  2 min focus on diaphragmatic breathing  1 min sitting    Pelvic tilts   5 sec x10 with diaphragmatic breathing    Add marches 10x teri alternating with PPT In sitting x 20 In sitting x 20   Seated marches/LAQ   Marches 2x10 with TA      Standing posture    Reviewed with TA, trialed ambulation with TA    MB press    Seated 10# KB 2 x 10 forward    Standing MB press     Trialed, immediate numbness in R knee   Dead bug variation X 10 alt heel taps                                Patient education    Discussed activity modification     Re-evaluation  IM,PT            Ther Act                                         Modalities

## 2025-02-21 ENCOUNTER — APPOINTMENT (OUTPATIENT)
Dept: PHYSICAL THERAPY | Facility: CLINIC | Age: 65
End: 2025-02-21
Payer: COMMERCIAL

## 2025-06-10 DIAGNOSIS — G45.9 TIA (TRANSIENT ISCHEMIC ATTACK): ICD-10-CM

## 2025-06-10 DIAGNOSIS — R29.90 STROKE-LIKE SYMPTOMS: ICD-10-CM

## 2025-06-10 RX ORDER — AMLODIPINE BESYLATE 10 MG/1
10 TABLET ORAL DAILY
Qty: 90 TABLET | Refills: 1 | Status: SHIPPED | OUTPATIENT
Start: 2025-06-10

## 2025-07-09 ENCOUNTER — OFFICE VISIT (OUTPATIENT)
Dept: INTERNAL MEDICINE CLINIC | Facility: CLINIC | Age: 65
End: 2025-07-09
Payer: COMMERCIAL

## 2025-07-09 VITALS
WEIGHT: 235 LBS | OXYGEN SATURATION: 98 % | HEIGHT: 69 IN | SYSTOLIC BLOOD PRESSURE: 150 MMHG | DIASTOLIC BLOOD PRESSURE: 80 MMHG | TEMPERATURE: 97 F | HEART RATE: 88 BPM | BODY MASS INDEX: 34.8 KG/M2

## 2025-07-09 DIAGNOSIS — R29.90 STROKE-LIKE SYMPTOMS: ICD-10-CM

## 2025-07-09 DIAGNOSIS — M54.50 CHRONIC RIGHT-SIDED LOW BACK PAIN WITHOUT SCIATICA: ICD-10-CM

## 2025-07-09 DIAGNOSIS — I10 PRIMARY HYPERTENSION: Primary | ICD-10-CM

## 2025-07-09 DIAGNOSIS — G89.29 CHRONIC RIGHT-SIDED LOW BACK PAIN WITHOUT SCIATICA: ICD-10-CM

## 2025-07-09 PROCEDURE — 99214 OFFICE O/P EST MOD 30 MIN: CPT | Performed by: NURSE PRACTITIONER

## 2025-07-09 RX ORDER — LISINOPRIL 10 MG/1
10 TABLET ORAL DAILY
Start: 2025-07-09

## 2025-07-09 NOTE — ASSESSMENT & PLAN NOTE
BP above goal. Increase lisinopril to 10 mg daily.  Recommend home blood pressure monitoring and report back in 2 weeks with BP log.   Recommend a low sodium diet.   Orders:    lisinopril (ZESTRIL) 10 mg tablet; Take 1 tablet (10 mg total) by mouth daily

## 2025-07-09 NOTE — PROGRESS NOTES
"Name: Yehuda Trejo      : 1960      MRN: [id number]  Encounter Provider: BEATA Palacio  Encounter Date: 2025   Encounter department: [facility] INTERNAL MEDICINE  :  Assessment & Plan  Primary hypertension  BP above goal. Increase lisinopril to 10 mg daily.  Recommend home blood pressure monitoring and report back in 2 weeks with BP log.   Recommend a low sodium diet.   Orders:    lisinopril (ZESTRIL) 10 mg tablet; Take 1 tablet (10 mg total) by mouth daily    Stroke-like symptoms  On low dose asa daily.   Declined statin in the past.   Orders:    CBC and differential    Chronic right-sided low back pain without sciatica  Finished PT.  Continue home exercise program.   Uses lidocaine and flexeril as needed.               History of Present Illness   Yehuda is here today for follow up.  He is doing well.  He was in PT for his back, still walking and trying to do stairs for exercise.     BP at home runs around 130-150/80's.     He denies any chest pain or shortness of breath.         Review of Systems   Constitutional:  Negative for activity change, appetite change and fatigue.   Respiratory:  Negative for cough and shortness of breath.    Cardiovascular:  Negative for chest pain, palpitations and leg swelling.   Gastrointestinal:  Negative for abdominal pain.   Genitourinary:  Negative for difficulty urinating.   Musculoskeletal:  Positive for arthralgias and back pain.   Neurological:  Negative for dizziness, light-headedness and headaches.   Psychiatric/Behavioral:  Negative for sleep disturbance.        Objective   /80   Pulse 88   Temp (!) 97 °F (36.1 °C)   Ht 5' 9\" (1.753 m)   Wt 107 kg (235 lb)   SpO2 98%   BMI 34.70 kg/m²      Physical Exam  Vitals reviewed.   Constitutional:       Appearance: Normal appearance.   HENT:      Head: Normocephalic and atraumatic.     Eyes:      Conjunctiva/sclera: Conjunctivae normal.       Cardiovascular:      Rate and Rhythm: Normal " rate and regular rhythm.      Heart sounds: Normal heart sounds.   Pulmonary:      Effort: Pulmonary effort is normal.      Breath sounds: Normal breath sounds.     Musculoskeletal:         General: Normal range of motion.      Right lower leg: No edema.      Left lower leg: No edema.     Skin:     General: Skin is warm and dry.     Neurological:      Mental Status: He is alert and oriented to person, place, and time.     Psychiatric:         Mood and Affect: Mood normal.         Behavior: Behavior normal.

## 2025-07-10 ENCOUNTER — APPOINTMENT (OUTPATIENT)
Dept: LAB | Facility: CLINIC | Age: 65
End: 2025-07-10
Attending: NURSE PRACTITIONER
Payer: COMMERCIAL

## 2025-07-10 DIAGNOSIS — R29.90 STROKE-LIKE SYMPTOMS: ICD-10-CM

## 2025-07-10 LAB
ALBUMIN SERPL BCG-MCNC: 4.2 G/DL (ref 3.5–5)
ALP SERPL-CCNC: 30 U/L (ref 34–104)
ALT SERPL W P-5'-P-CCNC: 22 U/L (ref 7–52)
ANION GAP SERPL CALCULATED.3IONS-SCNC: 6 MMOL/L (ref 4–13)
AST SERPL W P-5'-P-CCNC: 16 U/L (ref 13–39)
BASOPHILS # BLD AUTO: 0.06 THOUSANDS/ÂΜL (ref 0–0.1)
BASOPHILS NFR BLD AUTO: 1 % (ref 0–1)
BILIRUB SERPL-MCNC: 0.7 MG/DL (ref 0.2–1)
BUN SERPL-MCNC: 13 MG/DL (ref 5–25)
CALCIUM SERPL-MCNC: 9.2 MG/DL (ref 8.4–10.2)
CHLORIDE SERPL-SCNC: 102 MMOL/L (ref 96–108)
CHOLEST SERPL-MCNC: 189 MG/DL (ref ?–200)
CO2 SERPL-SCNC: 29 MMOL/L (ref 21–32)
CREAT SERPL-MCNC: 0.89 MG/DL (ref 0.6–1.3)
EOSINOPHIL # BLD AUTO: 0.2 THOUSAND/ÂΜL (ref 0–0.61)
EOSINOPHIL NFR BLD AUTO: 2 % (ref 0–6)
ERYTHROCYTE [DISTWIDTH] IN BLOOD BY AUTOMATED COUNT: 13.2 % (ref 11.6–15.1)
GFR SERPL CREATININE-BSD FRML MDRD: 89 ML/MIN/1.73SQ M
GLUCOSE P FAST SERPL-MCNC: 111 MG/DL (ref 65–99)
HCT VFR BLD AUTO: 45.7 % (ref 36.5–49.3)
HDLC SERPL-MCNC: 56 MG/DL
HGB BLD-MCNC: 15 G/DL (ref 12–17)
IMM GRANULOCYTES # BLD AUTO: 0.1 THOUSAND/UL (ref 0–0.2)
IMM GRANULOCYTES NFR BLD AUTO: 1 % (ref 0–2)
LDLC SERPL CALC-MCNC: 117 MG/DL (ref 0–100)
LYMPHOCYTES # BLD AUTO: 2 THOUSANDS/ÂΜL (ref 0.6–4.47)
LYMPHOCYTES NFR BLD AUTO: 22 % (ref 14–44)
MCH RBC QN AUTO: 30.5 PG (ref 26.8–34.3)
MCHC RBC AUTO-ENTMCNC: 32.8 G/DL (ref 31.4–37.4)
MCV RBC AUTO: 93 FL (ref 82–98)
MONOCYTES # BLD AUTO: 1.1 THOUSAND/ÂΜL (ref 0.17–1.22)
MONOCYTES NFR BLD AUTO: 12 % (ref 4–12)
NEUTROPHILS # BLD AUTO: 5.61 THOUSANDS/ÂΜL (ref 1.85–7.62)
NEUTS SEG NFR BLD AUTO: 62 % (ref 43–75)
NRBC BLD AUTO-RTO: 0 /100 WBCS
PLATELET # BLD AUTO: 306 THOUSANDS/UL (ref 149–390)
PMV BLD AUTO: 9.4 FL (ref 8.9–12.7)
POTASSIUM SERPL-SCNC: 3.8 MMOL/L (ref 3.5–5.3)
PROT SERPL-MCNC: 7.5 G/DL (ref 6.4–8.4)
RBC # BLD AUTO: 4.91 MILLION/UL (ref 3.88–5.62)
SODIUM SERPL-SCNC: 137 MMOL/L (ref 135–147)
TRIGL SERPL-MCNC: 82 MG/DL (ref ?–150)
TSH SERPL DL<=0.05 MIU/L-ACNC: 2.23 UIU/ML (ref 0.45–4.5)
WBC # BLD AUTO: 9.07 THOUSAND/UL (ref 4.31–10.16)

## 2025-07-10 PROCEDURE — 84443 ASSAY THYROID STIM HORMONE: CPT

## 2025-07-10 PROCEDURE — 80053 COMPREHEN METABOLIC PANEL: CPT

## 2025-07-10 PROCEDURE — 80061 LIPID PANEL: CPT

## 2025-07-10 PROCEDURE — 36415 COLL VENOUS BLD VENIPUNCTURE: CPT

## 2025-07-10 PROCEDURE — 85025 COMPLETE CBC W/AUTO DIFF WBC: CPT | Performed by: NURSE PRACTITIONER
